# Patient Record
Sex: MALE | Race: WHITE | NOT HISPANIC OR LATINO | Employment: UNEMPLOYED | URBAN - NONMETROPOLITAN AREA
[De-identification: names, ages, dates, MRNs, and addresses within clinical notes are randomized per-mention and may not be internally consistent; named-entity substitution may affect disease eponyms.]

---

## 2017-01-28 ENCOUNTER — HOSPITAL ENCOUNTER (EMERGENCY)
Facility: HOSPITAL | Age: 32
Discharge: ANOTHER HOSPITAL | End: 2017-01-29
Attending: EMERGENCY MEDICINE | Admitting: EMERGENCY MEDICINE

## 2017-01-28 DIAGNOSIS — F19.10 IV DRUG ABUSE (HCC): Primary | ICD-10-CM

## 2017-01-28 DIAGNOSIS — L03.114 CELLULITIS OF ARM, LEFT: ICD-10-CM

## 2017-01-28 PROCEDURE — 99284 EMERGENCY DEPT VISIT MOD MDM: CPT

## 2017-01-29 VITALS
SYSTOLIC BLOOD PRESSURE: 113 MMHG | RESPIRATION RATE: 18 BRPM | WEIGHT: 148 LBS | DIASTOLIC BLOOD PRESSURE: 73 MMHG | HEART RATE: 91 BPM | OXYGEN SATURATION: 100 % | TEMPERATURE: 97.9 F | HEIGHT: 70 IN | BODY MASS INDEX: 21.19 KG/M2

## 2017-01-29 PROCEDURE — 25010000002 CEFTRIAXONE PER 250 MG: Performed by: EMERGENCY MEDICINE

## 2017-01-29 PROCEDURE — 96372 THER/PROPH/DIAG INJ SC/IM: CPT

## 2017-01-29 RX ORDER — TRAZODONE HYDROCHLORIDE 150 MG/1
150 TABLET ORAL NIGHTLY
COMMUNITY
End: 2018-02-21 | Stop reason: SDDI

## 2017-01-29 RX ORDER — CEFTRIAXONE 1 G/1
1000 INJECTION, POWDER, FOR SOLUTION INTRAMUSCULAR; INTRAVENOUS ONCE
Status: COMPLETED | OUTPATIENT
Start: 2017-01-29 | End: 2017-01-29

## 2017-01-29 RX ORDER — BUPROPION HYDROCHLORIDE 150 MG/1
150 TABLET ORAL DAILY
COMMUNITY
End: 2018-02-21 | Stop reason: SDDI

## 2017-01-29 RX ORDER — LIDOCAINE HYDROCHLORIDE 10 MG/ML
2.1 INJECTION, SOLUTION INFILTRATION; PERINEURAL ONCE
Status: DISCONTINUED | OUTPATIENT
Start: 2017-01-29 | End: 2017-01-29 | Stop reason: HOSPADM

## 2017-01-29 RX ORDER — LIDOCAINE HYDROCHLORIDE 10 MG/ML
2.1 INJECTION, SOLUTION EPIDURAL; INFILTRATION; INTRACAUDAL; PERINEURAL ONCE
Status: COMPLETED | OUTPATIENT
Start: 2017-01-29 | End: 2017-01-29

## 2017-01-29 RX ORDER — HYDROCODONE BITARTRATE AND ACETAMINOPHEN 7.5; 325 MG/1; MG/1
1 TABLET ORAL ONCE
Status: COMPLETED | OUTPATIENT
Start: 2017-01-29 | End: 2017-01-29

## 2017-01-29 RX ADMIN — LIDOCAINE HYDROCHLORIDE 2.1 ML: 10 INJECTION, SOLUTION EPIDURAL; INFILTRATION; INTRACAUDAL; PERINEURAL at 02:40

## 2017-01-29 RX ADMIN — CEFTRIAXONE SODIUM 1000 MG: 1 INJECTION, POWDER, FOR SOLUTION INTRAMUSCULAR; INTRAVENOUS at 02:41

## 2017-01-29 RX ADMIN — HYDROCODONE BITARTRATE AND ACETAMINOPHEN 1 TABLET: 7.5; 325 TABLET ORAL at 02:40

## 2017-06-22 ENCOUNTER — HOSPITAL ENCOUNTER (EMERGENCY)
Facility: HOSPITAL | Age: 32
Discharge: HOME | End: 2017-06-22
Attending: EMERGENCY MEDICINE | Admitting: EMERGENCY MEDICINE

## 2017-06-22 VITALS
BODY MASS INDEX: 22.33 KG/M2 | DIASTOLIC BLOOD PRESSURE: 76 MMHG | HEART RATE: 61 BPM | TEMPERATURE: 98.6 F | SYSTOLIC BLOOD PRESSURE: 127 MMHG | WEIGHT: 156 LBS | OXYGEN SATURATION: 100 % | HEIGHT: 70 IN | RESPIRATION RATE: 20 BRPM

## 2017-06-22 DIAGNOSIS — R53.83 FATIGUE, UNSPECIFIED TYPE: Primary | ICD-10-CM

## 2017-06-22 DIAGNOSIS — B34.9 VIRAL SYNDROME: ICD-10-CM

## 2017-06-22 LAB
ALBUMIN SERPL-MCNC: 4.7 G/DL (ref 3.5–5)
ALBUMIN/GLOB SERPL: 1.6 G/DL (ref 1–2)
ALP SERPL-CCNC: 70 U/L (ref 38–126)
ALT SERPL W P-5'-P-CCNC: 73 U/L (ref 13–69)
AMPHET+METHAMPHET UR QL: NEGATIVE
AMPHETAMINES UR QL: NEGATIVE
ANION GAP SERPL CALCULATED.3IONS-SCNC: 18.2 MMOL/L
AST SERPL-CCNC: 42 U/L (ref 15–46)
BARBITURATES UR QL SCN: NEGATIVE
BASOPHILS # BLD AUTO: 0.05 10*3/MM3 (ref 0–0.2)
BASOPHILS NFR BLD AUTO: 0.5 % (ref 0–2.5)
BENZODIAZ UR QL SCN: NEGATIVE
BILIRUB SERPL-MCNC: 0.9 MG/DL (ref 0.2–1.3)
BILIRUB UR QL STRIP: NEGATIVE
BUN BLD-MCNC: 13 MG/DL (ref 7–20)
BUN/CREAT SERPL: 14.4 (ref 6.3–21.9)
BUPRENORPHINE SERPL-MCNC: POSITIVE NG/ML
CALCIUM SPEC-SCNC: 9.9 MG/DL (ref 8.4–10.2)
CANNABINOIDS SERPL QL: POSITIVE
CHLORIDE SERPL-SCNC: 101 MMOL/L (ref 98–107)
CLARITY UR: CLEAR
CO2 SERPL-SCNC: 29 MMOL/L (ref 26–30)
COCAINE UR QL: NEGATIVE
COLOR UR: YELLOW
CREAT BLD-MCNC: 0.9 MG/DL (ref 0.6–1.3)
DEPRECATED RDW RBC AUTO: 37.7 FL (ref 37–54)
EOSINOPHIL # BLD AUTO: 0.14 10*3/MM3 (ref 0–0.7)
EOSINOPHIL NFR BLD AUTO: 1.5 % (ref 0–7)
ERYTHROCYTE [DISTWIDTH] IN BLOOD BY AUTOMATED COUNT: 11.8 % (ref 11.5–14.5)
FLUAV AG NPH QL: NEGATIVE
FLUBV AG NPH QL IA: NEGATIVE
GFR SERPL CREATININE-BSD FRML MDRD: 100 ML/MIN/1.73
GLOBULIN UR ELPH-MCNC: 3 GM/DL
GLUCOSE BLD-MCNC: 80 MG/DL (ref 74–98)
GLUCOSE UR STRIP-MCNC: NEGATIVE MG/DL
HCT VFR BLD AUTO: 43.9 % (ref 42–52)
HGB BLD-MCNC: 15.7 G/DL (ref 14–18)
HGB UR QL STRIP.AUTO: NEGATIVE
IMM GRANULOCYTES # BLD: 0.03 10*3/MM3 (ref 0–0.06)
IMM GRANULOCYTES NFR BLD: 0.3 % (ref 0–0.6)
KETONES UR QL STRIP: NEGATIVE
LEUKOCYTE ESTERASE UR QL STRIP.AUTO: NEGATIVE
LYMPHOCYTES # BLD AUTO: 3.14 10*3/MM3 (ref 0.6–3.4)
LYMPHOCYTES NFR BLD AUTO: 34.2 % (ref 10–50)
MCH RBC QN AUTO: 31.6 PG (ref 27–31)
MCHC RBC AUTO-ENTMCNC: 35.8 G/DL (ref 30–37)
MCV RBC AUTO: 88.3 FL (ref 80–94)
METHADONE UR QL SCN: NEGATIVE
MONOCYTES # BLD AUTO: 0.76 10*3/MM3 (ref 0–0.9)
MONOCYTES NFR BLD AUTO: 8.3 % (ref 0–12)
NEUTROPHILS # BLD AUTO: 5.05 10*3/MM3 (ref 2–6.9)
NEUTROPHILS NFR BLD AUTO: 55.2 % (ref 37–80)
NITRITE UR QL STRIP: NEGATIVE
NRBC BLD MANUAL-RTO: 0 /100 WBC (ref 0–0)
OPIATES UR QL: NEGATIVE
OXYCODONE UR QL SCN: NEGATIVE
PCP UR QL SCN: NEGATIVE
PH UR STRIP.AUTO: 5.5 [PH] (ref 5–8)
PLATELET # BLD AUTO: 212 10*3/MM3 (ref 130–400)
PMV BLD AUTO: 10 FL (ref 6–12)
POTASSIUM BLD-SCNC: 4.2 MMOL/L (ref 3.5–5.1)
PROPOXYPH UR QL: NEGATIVE
PROT SERPL-MCNC: 7.7 G/DL (ref 6.3–8.2)
PROT UR QL STRIP: NEGATIVE
RBC # BLD AUTO: 4.97 10*6/MM3 (ref 4.7–6.1)
S PYO AG THROAT QL: NEGATIVE
SODIUM BLD-SCNC: 144 MMOL/L (ref 137–145)
SP GR UR STRIP: 1.02 (ref 1–1.03)
TRICYCLICS UR QL SCN: NEGATIVE
UROBILINOGEN UR QL STRIP: NORMAL
WBC NRBC COR # BLD: 9.17 10*3/MM3 (ref 4.8–10.8)

## 2017-06-22 PROCEDURE — 87880 STREP A ASSAY W/OPTIC: CPT | Performed by: PHYSICIAN ASSISTANT

## 2017-06-22 PROCEDURE — 96360 HYDRATION IV INFUSION INIT: CPT

## 2017-06-22 PROCEDURE — 80306 DRUG TEST PRSMV INSTRMNT: CPT | Performed by: PHYSICIAN ASSISTANT

## 2017-06-22 PROCEDURE — 80053 COMPREHEN METABOLIC PANEL: CPT | Performed by: PHYSICIAN ASSISTANT

## 2017-06-22 PROCEDURE — 85025 COMPLETE CBC W/AUTO DIFF WBC: CPT | Performed by: PHYSICIAN ASSISTANT

## 2017-06-22 PROCEDURE — 87804 INFLUENZA ASSAY W/OPTIC: CPT | Performed by: PHYSICIAN ASSISTANT

## 2017-06-22 PROCEDURE — 81003 URINALYSIS AUTO W/O SCOPE: CPT | Performed by: PHYSICIAN ASSISTANT

## 2017-06-22 PROCEDURE — 87081 CULTURE SCREEN ONLY: CPT | Performed by: PHYSICIAN ASSISTANT

## 2017-06-22 PROCEDURE — 99284 EMERGENCY DEPT VISIT MOD MDM: CPT

## 2017-06-22 RX ORDER — ONDANSETRON 4 MG/1
4 TABLET, FILM COATED ORAL EVERY 6 HOURS
Qty: 10 TABLET | Refills: 0 | Status: SHIPPED | OUTPATIENT
Start: 2017-06-22 | End: 2018-02-21 | Stop reason: SDDI

## 2017-06-22 RX ORDER — SODIUM CHLORIDE 0.9 % (FLUSH) 0.9 %
10 SYRINGE (ML) INJECTION AS NEEDED
Status: DISCONTINUED | OUTPATIENT
Start: 2017-06-22 | End: 2017-06-23 | Stop reason: HOSPADM

## 2017-06-22 RX ADMIN — SODIUM CHLORIDE 1000 ML: 9 INJECTION, SOLUTION INTRAVENOUS at 21:45

## 2017-06-23 NOTE — ED PROVIDER NOTES
Subjective   Patient is a 31 y.o. male presenting with fatigue.   History provided by:  Patient   used: No    Fatigue   Location:  Generalized  Severity:  Moderate  Onset quality:  Sudden  Duration:  2 days  Timing:  Constant  Progression:  Worsening  Chronicity:  New  Associated symptoms: fatigue, nausea and vomiting    Associated symptoms: no congestion, no cough, no fever, no headaches and no loss of consciousness        Review of Systems   Constitutional: Positive for chills and fatigue. Negative for fever.   HENT: Negative for congestion.    Respiratory: Negative for cough.    Gastrointestinal: Positive for nausea and vomiting.   Neurological: Negative for loss of consciousness and headaches.   All other systems reviewed and are negative.      Past Medical History:   Diagnosis Date   • Anxiety    • Depression        No Known Allergies    History reviewed. No pertinent surgical history.    History reviewed. No pertinent family history.    Social History     Social History   • Marital status: Single     Spouse name: N/A   • Number of children: N/A   • Years of education: N/A     Social History Main Topics   • Smoking status: Current Every Day Smoker     Packs/day: 0.50     Types: Cigarettes   • Smokeless tobacco: None   • Alcohol use None   • Drug use: No   • Sexual activity: Not Asked     Other Topics Concern   • None     Social History Narrative   • None           Objective   Physical Exam   Constitutional: He is oriented to person, place, and time. He appears well-developed and well-nourished.   HENT:   Head: Normocephalic and atraumatic.   Right Ear: External ear normal.   Left Ear: External ear normal.   Nose: Nose normal.   Mouth/Throat: Oropharynx is clear and moist.   Eyes: Conjunctivae and EOM are normal. Pupils are equal, round, and reactive to light.   Neck: Normal range of motion. Neck supple.   Cardiovascular: Normal rate, regular rhythm, normal heart sounds and intact distal  pulses.    Pulmonary/Chest: Effort normal and breath sounds normal.   Abdominal: Soft. Normal appearance and bowel sounds are normal. There is generalized tenderness.   Musculoskeletal: Normal range of motion.   Neurological: He is alert and oriented to person, place, and time.   Skin: Skin is warm and dry.   Psychiatric: He has a normal mood and affect. His behavior is normal. Judgment and thought content normal.   Nursing note and vitals reviewed.      Procedures         ED Course  ED Course   Comment By Time   31-year-old male comes in complaining of fatigue, weakness, nausea, vomiting for the past 4-5 days.  Patient denies any cough, congestion, headache, dizziness, diarrhea, constipation.  Patient does state he said mild diffuse abdominal cramping. Rizwan Michel PA-C 06/22 2108                  MDM  Number of Diagnoses or Management Options  Fatigue, unspecified type: new and requires workup  Viral syndrome: new and requires workup     Amount and/or Complexity of Data Reviewed  Clinical lab tests: ordered and reviewed    Risk of Complications, Morbidity, and/or Mortality  Presenting problems: moderate  Diagnostic procedures: moderate  Management options: moderate    Patient Progress  Patient progress: stable      Final diagnoses:   Fatigue, unspecified type   Viral syndrome            Rizwan Michel PA-C  06/22/17 3448

## 2017-06-24 LAB — BACTERIA SPEC AEROBE CULT: NORMAL

## 2020-05-11 ENCOUNTER — APPOINTMENT (OUTPATIENT)
Dept: GENERAL RADIOLOGY | Facility: HOSPITAL | Age: 35
End: 2020-05-11

## 2020-05-11 ENCOUNTER — HOSPITAL ENCOUNTER (INPATIENT)
Facility: HOSPITAL | Age: 35
LOS: 2 days | Discharge: HOME | End: 2020-05-14
Attending: EMERGENCY MEDICINE | Admitting: INTERNAL MEDICINE

## 2020-05-11 DIAGNOSIS — J96.01 ACUTE RESPIRATORY FAILURE WITH HYPOXIA (HCC): ICD-10-CM

## 2020-05-11 DIAGNOSIS — T40.1X1A ACCIDENTAL OVERDOSE OF HEROIN, INITIAL ENCOUNTER (HCC): Primary | ICD-10-CM

## 2020-05-11 PROCEDURE — 71045 X-RAY EXAM CHEST 1 VIEW: CPT

## 2020-05-11 PROCEDURE — 99285 EMERGENCY DEPT VISIT HI MDM: CPT

## 2020-05-11 RX ORDER — NALOXONE HYDROCHLORIDE 1 MG/ML
INJECTION INTRAMUSCULAR; INTRAVENOUS; SUBCUTANEOUS
Status: COMPLETED
Start: 2020-05-11 | End: 2020-05-11

## 2020-05-11 RX ADMIN — NALOXONE HYDROCHLORIDE 2 MG: 1 INJECTION PARENTERAL at 23:38

## 2020-05-12 ENCOUNTER — APPOINTMENT (OUTPATIENT)
Dept: CT IMAGING | Facility: HOSPITAL | Age: 35
End: 2020-05-12

## 2020-05-12 PROBLEM — R00.0 TACHYCARDIA: Status: ACTIVE | Noted: 2020-05-12

## 2020-05-12 PROBLEM — T40.1X1A ACCIDENTAL OVERDOSE OF HEROIN (HCC): Status: ACTIVE | Noted: 2020-05-12

## 2020-05-12 PROBLEM — R73.9 HYPERGLYCEMIA: Status: ACTIVE | Noted: 2020-05-12

## 2020-05-12 PROBLEM — J96.01 ACUTE RESPIRATORY FAILURE WITH HYPOXIA (HCC): Status: ACTIVE | Noted: 2020-05-12

## 2020-05-12 PROBLEM — D72.829 LEUKOCYTOSIS: Status: ACTIVE | Noted: 2020-05-12

## 2020-05-12 PROBLEM — R11.10 NON-INTRACTABLE VOMITING: Status: ACTIVE | Noted: 2020-05-12

## 2020-05-12 PROBLEM — A41.9 SEPSIS, UNSPECIFIED ORGANISM (HCC): Status: ACTIVE | Noted: 2020-05-12

## 2020-05-12 PROBLEM — Z20.822 SUSPECTED COVID-19 VIRUS INFECTION: Status: ACTIVE | Noted: 2020-05-12

## 2020-05-12 PROBLEM — J69.0 ASPIRATION PNEUMONIA DUE TO VOMITUS (HCC): Status: ACTIVE | Noted: 2020-05-12

## 2020-05-12 PROBLEM — R79.89 ELEVATED D-DIMER: Status: ACTIVE | Noted: 2020-05-12

## 2020-05-12 LAB
ALBUMIN SERPL-MCNC: 4.8 G/DL (ref 3.5–5.2)
ALBUMIN SERPL-MCNC: 5.4 G/DL (ref 3.5–5.2)
ALBUMIN/GLOB SERPL: 1.9 G/DL
ALBUMIN/GLOB SERPL: 2.1 G/DL
ALP SERPL-CCNC: 127 U/L (ref 39–117)
ALP SERPL-CCNC: 142 U/L (ref 39–117)
ALT SERPL W P-5'-P-CCNC: 18 U/L (ref 1–41)
ALT SERPL W P-5'-P-CCNC: 23 U/L (ref 1–41)
AMPHET+METHAMPHET UR QL: POSITIVE
AMPHETAMINES UR QL: NEGATIVE
ANION GAP SERPL CALCULATED.3IONS-SCNC: 13.8 MMOL/L (ref 5–15)
ANION GAP SERPL CALCULATED.3IONS-SCNC: 14.4 MMOL/L (ref 5–15)
AST SERPL-CCNC: 28 U/L (ref 1–40)
AST SERPL-CCNC: 32 U/L (ref 1–40)
B PERT DNA SPEC QL NAA+PROBE: NOT DETECTED
BARBITURATES UR QL SCN: NEGATIVE
BASOPHILS # BLD AUTO: 0.08 10*3/MM3 (ref 0–0.2)
BASOPHILS # BLD AUTO: 0.13 10*3/MM3 (ref 0–0.2)
BASOPHILS NFR BLD AUTO: 0.3 % (ref 0–1.5)
BASOPHILS NFR BLD AUTO: 0.5 % (ref 0–1.5)
BENZODIAZ UR QL SCN: NEGATIVE
BILIRUB SERPL-MCNC: 0.4 MG/DL (ref 0.2–1.2)
BILIRUB SERPL-MCNC: 0.6 MG/DL (ref 0.2–1.2)
BILIRUB UR QL STRIP: NEGATIVE
BUN BLD-MCNC: 19 MG/DL (ref 6–20)
BUN BLD-MCNC: 20 MG/DL (ref 6–20)
BUN/CREAT SERPL: 17.1 (ref 7–25)
BUN/CREAT SERPL: 17.1 (ref 7–25)
BUPRENORPHINE SERPL-MCNC: NEGATIVE NG/ML
C PNEUM DNA NPH QL NAA+NON-PROBE: NOT DETECTED
CALCIUM SPEC-SCNC: 9.8 MG/DL (ref 8.6–10.5)
CALCIUM SPEC-SCNC: 9.9 MG/DL (ref 8.6–10.5)
CANNABINOIDS SERPL QL: POSITIVE
CHLORIDE SERPL-SCNC: 102 MMOL/L (ref 98–107)
CHLORIDE SERPL-SCNC: 102 MMOL/L (ref 98–107)
CLARITY UR: CLEAR
CO2 SERPL-SCNC: 24.2 MMOL/L (ref 22–29)
CO2 SERPL-SCNC: 24.6 MMOL/L (ref 22–29)
COCAINE UR QL: NEGATIVE
COLOR UR: YELLOW
CREAT BLD-MCNC: 1.11 MG/DL (ref 0.76–1.27)
CREAT BLD-MCNC: 1.17 MG/DL (ref 0.76–1.27)
CRP SERPL-MCNC: 0.08 MG/DL (ref 0–0.5)
D DIMER PPP FEU-MCNC: 1.2 MCGFEU/ML (ref 0–0.57)
D-LACTATE SERPL-SCNC: 1.2 MMOL/L (ref 0.5–2)
DEPRECATED RDW RBC AUTO: 39.8 FL (ref 37–54)
DEPRECATED RDW RBC AUTO: 40.9 FL (ref 37–54)
EOSINOPHIL # BLD AUTO: 0 10*3/MM3 (ref 0–0.4)
EOSINOPHIL # BLD AUTO: 0.01 10*3/MM3 (ref 0–0.4)
EOSINOPHIL NFR BLD AUTO: 0 % (ref 0.3–6.2)
EOSINOPHIL NFR BLD AUTO: 0 % (ref 0.3–6.2)
ERYTHROCYTE [DISTWIDTH] IN BLOOD BY AUTOMATED COUNT: 12 % (ref 12.3–15.4)
ERYTHROCYTE [DISTWIDTH] IN BLOOD BY AUTOMATED COUNT: 12.1 % (ref 12.3–15.4)
ETHANOL BLD-MCNC: <10 MG/DL (ref 0–10)
ETHANOL UR QL: <0.01 %
FERRITIN SERPL-MCNC: 86.23 NG/ML (ref 30–400)
FLUAV H1 2009 PAND RNA NPH QL NAA+PROBE: NOT DETECTED
FLUAV H1 HA GENE NPH QL NAA+PROBE: NOT DETECTED
FLUAV H3 RNA NPH QL NAA+PROBE: NOT DETECTED
FLUAV SUBTYP SPEC NAA+PROBE: NOT DETECTED
FLUBV RNA ISLT QL NAA+PROBE: NOT DETECTED
GFR SERPL CREATININE-BSD FRML MDRD: 72 ML/MIN/1.73
GFR SERPL CREATININE-BSD FRML MDRD: 77 ML/MIN/1.73
GLOBULIN UR ELPH-MCNC: 2.5 GM/DL
GLOBULIN UR ELPH-MCNC: 2.6 GM/DL
GLUCOSE BLD-MCNC: 110 MG/DL (ref 65–99)
GLUCOSE BLD-MCNC: 147 MG/DL (ref 65–99)
GLUCOSE BLDC GLUCOMTR-MCNC: 110 MG/DL (ref 70–130)
GLUCOSE BLDC GLUCOMTR-MCNC: 126 MG/DL (ref 70–130)
GLUCOSE BLDC GLUCOMTR-MCNC: 145 MG/DL (ref 70–130)
GLUCOSE UR STRIP-MCNC: NEGATIVE MG/DL
HADV DNA SPEC NAA+PROBE: NOT DETECTED
HBA1C MFR BLD: 4.9 % (ref 4.8–5.6)
HCOV 229E RNA SPEC QL NAA+PROBE: NOT DETECTED
HCOV HKU1 RNA SPEC QL NAA+PROBE: NOT DETECTED
HCOV NL63 RNA SPEC QL NAA+PROBE: NOT DETECTED
HCOV OC43 RNA SPEC QL NAA+PROBE: NOT DETECTED
HCT VFR BLD AUTO: 48.9 % (ref 37.5–51)
HCT VFR BLD AUTO: 53.2 % (ref 37.5–51)
HGB BLD-MCNC: 17.2 G/DL (ref 13–17.7)
HGB BLD-MCNC: 18.6 G/DL (ref 13–17.7)
HGB UR QL STRIP.AUTO: NEGATIVE
HMPV RNA NPH QL NAA+NON-PROBE: NOT DETECTED
HPIV1 RNA SPEC QL NAA+PROBE: NOT DETECTED
HPIV2 RNA SPEC QL NAA+PROBE: NOT DETECTED
HPIV3 RNA NPH QL NAA+PROBE: NOT DETECTED
HPIV4 P GENE NPH QL NAA+PROBE: NOT DETECTED
IMM GRANULOCYTES # BLD AUTO: 0.18 10*3/MM3 (ref 0–0.05)
IMM GRANULOCYTES # BLD AUTO: 0.19 10*3/MM3 (ref 0–0.05)
IMM GRANULOCYTES NFR BLD AUTO: 0.7 % (ref 0–0.5)
IMM GRANULOCYTES NFR BLD AUTO: 0.7 % (ref 0–0.5)
KETONES UR QL STRIP: NEGATIVE
L PNEUMO1 AG UR QL IA: NEGATIVE
LDH SERPL-CCNC: 238 U/L (ref 135–225)
LEUKOCYTE ESTERASE UR QL STRIP.AUTO: NEGATIVE
LYMPHOCYTES # BLD AUTO: 1.38 10*3/MM3 (ref 0.7–3.1)
LYMPHOCYTES # BLD AUTO: 1.57 10*3/MM3 (ref 0.7–3.1)
LYMPHOCYTES NFR BLD AUTO: 5 % (ref 19.6–45.3)
LYMPHOCYTES NFR BLD AUTO: 6 % (ref 19.6–45.3)
M PNEUMO IGG SER IA-ACNC: NOT DETECTED
MAGNESIUM SERPL-MCNC: 2.1 MG/DL (ref 1.6–2.6)
MCH RBC QN AUTO: 31.9 PG (ref 26.6–33)
MCH RBC QN AUTO: 32.1 PG (ref 26.6–33)
MCHC RBC AUTO-ENTMCNC: 35 G/DL (ref 31.5–35.7)
MCHC RBC AUTO-ENTMCNC: 35.2 G/DL (ref 31.5–35.7)
MCV RBC AUTO: 90.7 FL (ref 79–97)
MCV RBC AUTO: 91.7 FL (ref 79–97)
METHADONE UR QL SCN: NEGATIVE
MONOCYTES # BLD AUTO: 1.47 10*3/MM3 (ref 0.1–0.9)
MONOCYTES # BLD AUTO: 1.57 10*3/MM3 (ref 0.1–0.9)
MONOCYTES NFR BLD AUTO: 5.6 % (ref 5–12)
MONOCYTES NFR BLD AUTO: 5.7 % (ref 5–12)
MRSA DNA SPEC QL NAA+PROBE: ABNORMAL
NEUTROPHILS # BLD AUTO: 23.07 10*3/MM3 (ref 1.7–7)
NEUTROPHILS # BLD AUTO: 24.5 10*3/MM3 (ref 1.7–7)
NEUTROPHILS NFR BLD AUTO: 87.4 % (ref 42.7–76)
NEUTROPHILS NFR BLD AUTO: 88.1 % (ref 42.7–76)
NITRITE UR QL STRIP: NEGATIVE
NRBC BLD AUTO-RTO: 0 /100 WBC (ref 0–0.2)
NRBC BLD AUTO-RTO: 0 /100 WBC (ref 0–0.2)
NT-PROBNP SERPL-MCNC: 10.2 PG/ML (ref 5–450)
OPIATES UR QL: NEGATIVE
OXYCODONE UR QL SCN: NEGATIVE
PCP UR QL SCN: NEGATIVE
PH UR STRIP.AUTO: <=5 [PH] (ref 5–8)
PLATELET # BLD AUTO: 252 10*3/MM3 (ref 140–450)
PLATELET # BLD AUTO: 259 10*3/MM3 (ref 140–450)
PMV BLD AUTO: 10 FL (ref 6–12)
PMV BLD AUTO: 10.4 FL (ref 6–12)
POTASSIUM BLD-SCNC: 4.3 MMOL/L (ref 3.5–5.2)
POTASSIUM BLD-SCNC: 5.2 MMOL/L (ref 3.5–5.2)
PROCALCITONIN SERPL-MCNC: 0.07 NG/ML (ref 0.1–0.25)
PROPOXYPH UR QL: NEGATIVE
PROT SERPL-MCNC: 7.3 G/DL (ref 6–8.5)
PROT SERPL-MCNC: 8 G/DL (ref 6–8.5)
PROT UR QL STRIP: NEGATIVE
RBC # BLD AUTO: 5.39 10*6/MM3 (ref 4.14–5.8)
RBC # BLD AUTO: 5.8 10*6/MM3 (ref 4.14–5.8)
RHINOVIRUS RNA SPEC NAA+PROBE: NOT DETECTED
RSV RNA NPH QL NAA+NON-PROBE: NOT DETECTED
S PNEUM AG SPEC QL LA: NEGATIVE
SARS-COV-2 RNA RESP QL NAA+PROBE: NOT DETECTED
SODIUM BLD-SCNC: 140 MMOL/L (ref 136–145)
SODIUM BLD-SCNC: 141 MMOL/L (ref 136–145)
SP GR UR STRIP: >=1.03 (ref 1–1.03)
TRICYCLICS UR QL SCN: NEGATIVE
TROPONIN T SERPL-MCNC: <0.01 NG/ML (ref 0–0.03)
UROBILINOGEN UR QL STRIP: NORMAL
WBC NRBC COR # BLD: 26.38 10*3/MM3 (ref 3.4–10.8)
WBC NRBC COR # BLD: 27.77 10*3/MM3 (ref 3.4–10.8)

## 2020-05-12 PROCEDURE — 25010000002 VANCOMYCIN 5 G RECONSTITUTED SOLUTION 5,000 MG VIAL: Performed by: EMERGENCY MEDICINE

## 2020-05-12 PROCEDURE — 83735 ASSAY OF MAGNESIUM: CPT | Performed by: FAMILY MEDICINE

## 2020-05-12 PROCEDURE — 80053 COMPREHEN METABOLIC PANEL: CPT | Performed by: EMERGENCY MEDICINE

## 2020-05-12 PROCEDURE — 93005 ELECTROCARDIOGRAM TRACING: CPT | Performed by: EMERGENCY MEDICINE

## 2020-05-12 PROCEDURE — 85379 FIBRIN DEGRADATION QUANT: CPT | Performed by: FAMILY MEDICINE

## 2020-05-12 PROCEDURE — 25010000002 VANCOMYCIN 5 G RECONSTITUTED SOLUTION 5,000 MG VIAL: Performed by: FAMILY MEDICINE

## 2020-05-12 PROCEDURE — 25010000002 PIPERACILLIN SOD-TAZOBACTAM PER 1 G: Performed by: EMERGENCY MEDICINE

## 2020-05-12 PROCEDURE — 85025 COMPLETE CBC W/AUTO DIFF WBC: CPT | Performed by: EMERGENCY MEDICINE

## 2020-05-12 PROCEDURE — 85025 COMPLETE CBC W/AUTO DIFF WBC: CPT | Performed by: FAMILY MEDICINE

## 2020-05-12 PROCEDURE — 84484 ASSAY OF TROPONIN QUANT: CPT | Performed by: EMERGENCY MEDICINE

## 2020-05-12 PROCEDURE — 82962 GLUCOSE BLOOD TEST: CPT

## 2020-05-12 PROCEDURE — 71275 CT ANGIOGRAPHY CHEST: CPT

## 2020-05-12 PROCEDURE — 87635 SARS-COV-2 COVID-19 AMP PRB: CPT | Performed by: EMERGENCY MEDICINE

## 2020-05-12 PROCEDURE — 87040 BLOOD CULTURE FOR BACTERIA: CPT | Performed by: EMERGENCY MEDICINE

## 2020-05-12 PROCEDURE — 84145 PROCALCITONIN (PCT): CPT | Performed by: FAMILY MEDICINE

## 2020-05-12 PROCEDURE — 0099U HC BIOFIRE FILMARRAY RESP PANEL 1: CPT | Performed by: FAMILY MEDICINE

## 2020-05-12 PROCEDURE — 81003 URINALYSIS AUTO W/O SCOPE: CPT | Performed by: FAMILY MEDICINE

## 2020-05-12 PROCEDURE — 83605 ASSAY OF LACTIC ACID: CPT | Performed by: EMERGENCY MEDICINE

## 2020-05-12 PROCEDURE — 87899 AGENT NOS ASSAY W/OPTIC: CPT | Performed by: FAMILY MEDICINE

## 2020-05-12 PROCEDURE — 83880 ASSAY OF NATRIURETIC PEPTIDE: CPT | Performed by: EMERGENCY MEDICINE

## 2020-05-12 PROCEDURE — 25010000002 ONDANSETRON PER 1 MG: Performed by: EMERGENCY MEDICINE

## 2020-05-12 PROCEDURE — 80053 COMPREHEN METABOLIC PANEL: CPT | Performed by: FAMILY MEDICINE

## 2020-05-12 PROCEDURE — 87641 MR-STAPH DNA AMP PROBE: CPT | Performed by: FAMILY MEDICINE

## 2020-05-12 PROCEDURE — 83036 HEMOGLOBIN GLYCOSYLATED A1C: CPT | Performed by: FAMILY MEDICINE

## 2020-05-12 PROCEDURE — 83615 LACTATE (LD) (LDH) ENZYME: CPT | Performed by: FAMILY MEDICINE

## 2020-05-12 PROCEDURE — 80307 DRUG TEST PRSMV CHEM ANLYZR: CPT | Performed by: EMERGENCY MEDICINE

## 2020-05-12 PROCEDURE — 25010000002 IOPAMIDOL 61 % SOLUTION: Performed by: FAMILY MEDICINE

## 2020-05-12 PROCEDURE — 82728 ASSAY OF FERRITIN: CPT | Performed by: FAMILY MEDICINE

## 2020-05-12 PROCEDURE — 94799 UNLISTED PULMONARY SVC/PX: CPT

## 2020-05-12 PROCEDURE — 86140 C-REACTIVE PROTEIN: CPT | Performed by: FAMILY MEDICINE

## 2020-05-12 PROCEDURE — 25010000002 PIPERACILLIN SOD-TAZOBACTAM PER 1 G: Performed by: FAMILY MEDICINE

## 2020-05-12 PROCEDURE — 99223 1ST HOSP IP/OBS HIGH 75: CPT | Performed by: FAMILY MEDICINE

## 2020-05-12 RX ORDER — SODIUM CHLORIDE 0.9 % (FLUSH) 0.9 %
10 SYRINGE (ML) INJECTION EVERY 12 HOURS SCHEDULED
Status: DISCONTINUED | OUTPATIENT
Start: 2020-05-12 | End: 2020-05-14 | Stop reason: HOSPADM

## 2020-05-12 RX ORDER — HYDROXYZINE PAMOATE 25 MG/1
25-50 CAPSULE ORAL EVERY 6 HOURS PRN
COMMUNITY
End: 2020-06-19

## 2020-05-12 RX ORDER — VENLAFAXINE HYDROCHLORIDE 150 MG/1
150 CAPSULE, EXTENDED RELEASE ORAL DAILY
COMMUNITY

## 2020-05-12 RX ORDER — MULTIPLE VITAMINS W/ MINERALS TAB 9MG-400MCG
1 TAB ORAL DAILY
COMMUNITY
End: 2020-05-14 | Stop reason: HOSPADM

## 2020-05-12 RX ORDER — BUPROPION HYDROCHLORIDE 100 MG/1
100 TABLET ORAL DAILY
Status: DISCONTINUED | OUTPATIENT
Start: 2020-05-12 | End: 2020-05-14 | Stop reason: HOSPADM

## 2020-05-12 RX ORDER — QUETIAPINE FUMARATE 50 MG/1
50-100 TABLET, FILM COATED ORAL NIGHTLY
COMMUNITY
End: 2020-05-14 | Stop reason: HOSPADM

## 2020-05-12 RX ORDER — BUPROPION HYDROCHLORIDE 100 MG/1
300 TABLET ORAL DAILY
Status: ON HOLD | COMMUNITY
End: 2020-05-12

## 2020-05-12 RX ORDER — DEXTROSE MONOHYDRATE 25 G/50ML
25 INJECTION, SOLUTION INTRAVENOUS
Status: DISCONTINUED | OUTPATIENT
Start: 2020-05-12 | End: 2020-05-14 | Stop reason: HOSPADM

## 2020-05-12 RX ORDER — CLONIDINE HYDROCHLORIDE 0.1 MG/1
0.1 TABLET ORAL NIGHTLY
COMMUNITY

## 2020-05-12 RX ORDER — DEXTROAMPHETAMINE SACCHARATE, AMPHETAMINE ASPARTATE, DEXTROAMPHETAMINE SULFATE AND AMPHETAMINE SULFATE 5; 5; 5; 5 MG/1; MG/1; MG/1; MG/1
20 TABLET ORAL 2 TIMES DAILY
Status: DISCONTINUED | OUTPATIENT
Start: 2020-05-12 | End: 2020-05-14 | Stop reason: HOSPADM

## 2020-05-12 RX ORDER — SODIUM CHLORIDE 9 MG/ML
125 INJECTION, SOLUTION INTRAVENOUS CONTINUOUS
Status: DISCONTINUED | OUTPATIENT
Start: 2020-05-12 | End: 2020-05-14 | Stop reason: HOSPADM

## 2020-05-12 RX ORDER — VENLAFAXINE HYDROCHLORIDE 150 MG/1
150 CAPSULE, EXTENDED RELEASE ORAL
Status: DISCONTINUED | OUTPATIENT
Start: 2020-05-12 | End: 2020-05-14 | Stop reason: HOSPADM

## 2020-05-12 RX ORDER — QUETIAPINE FUMARATE 50 MG/1
25 TABLET, FILM COATED ORAL EVERY 8 HOURS PRN
COMMUNITY
End: 2020-05-14 | Stop reason: HOSPADM

## 2020-05-12 RX ORDER — ALBUTEROL SULFATE 90 UG/1
2 AEROSOL, METERED RESPIRATORY (INHALATION)
Status: DISCONTINUED | OUTPATIENT
Start: 2020-05-12 | End: 2020-05-14 | Stop reason: HOSPADM

## 2020-05-12 RX ORDER — VENLAFAXINE HYDROCHLORIDE 75 MG/1
75 CAPSULE, EXTENDED RELEASE ORAL DAILY
COMMUNITY
End: 2020-05-14 | Stop reason: HOSPADM

## 2020-05-12 RX ORDER — NICOTINE POLACRILEX 4 MG
1 LOZENGE BUCCAL
Status: DISCONTINUED | OUTPATIENT
Start: 2020-05-12 | End: 2020-05-14 | Stop reason: HOSPADM

## 2020-05-12 RX ORDER — NICOTINE 21 MG/24HR
1 PATCH, TRANSDERMAL 24 HOURS TRANSDERMAL EVERY 24 HOURS
Status: DISCONTINUED | OUTPATIENT
Start: 2020-05-12 | End: 2020-05-14 | Stop reason: HOSPADM

## 2020-05-12 RX ORDER — BUPROPION HYDROCHLORIDE 150 MG/1
300 TABLET ORAL DAILY
COMMUNITY

## 2020-05-12 RX ORDER — ONDANSETRON 2 MG/ML
4 INJECTION INTRAMUSCULAR; INTRAVENOUS ONCE
Status: COMPLETED | OUTPATIENT
Start: 2020-05-12 | End: 2020-05-12

## 2020-05-12 RX ORDER — ONDANSETRON 2 MG/ML
4 INJECTION INTRAMUSCULAR; INTRAVENOUS EVERY 6 HOURS PRN
Status: DISCONTINUED | OUTPATIENT
Start: 2020-05-12 | End: 2020-05-14 | Stop reason: HOSPADM

## 2020-05-12 RX ORDER — CLONAZEPAM 0.5 MG/1
0.5 TABLET ORAL DAILY PRN
COMMUNITY
End: 2020-05-14 | Stop reason: HOSPADM

## 2020-05-12 RX ORDER — SODIUM CHLORIDE 0.9 % (FLUSH) 0.9 %
10 SYRINGE (ML) INJECTION AS NEEDED
Status: DISCONTINUED | OUTPATIENT
Start: 2020-05-12 | End: 2020-05-14 | Stop reason: HOSPADM

## 2020-05-12 RX ADMIN — NICOTINE 1 PATCH: 21 PATCH TRANSDERMAL at 05:03

## 2020-05-12 RX ADMIN — IOPAMIDOL 100 ML: 612 INJECTION, SOLUTION INTRAVENOUS at 05:15

## 2020-05-12 RX ADMIN — SODIUM CHLORIDE 100 ML/HR: 9 INJECTION, SOLUTION INTRAVENOUS at 05:41

## 2020-05-12 RX ADMIN — SODIUM CHLORIDE, PRESERVATIVE FREE 10 ML: 5 INJECTION INTRAVENOUS at 09:24

## 2020-05-12 RX ADMIN — VANCOMYCIN HYDROCHLORIDE 1500 MG: 500 INJECTION, POWDER, LYOPHILIZED, FOR SOLUTION INTRAVENOUS at 17:13

## 2020-05-12 RX ADMIN — ONDANSETRON 4 MG: 2 INJECTION INTRAMUSCULAR; INTRAVENOUS at 00:53

## 2020-05-12 RX ADMIN — VANCOMYCIN HYDROCHLORIDE 1750 MG: 500 INJECTION, POWDER, LYOPHILIZED, FOR SOLUTION INTRAVENOUS at 03:46

## 2020-05-12 RX ADMIN — SODIUM CHLORIDE 125 ML/HR: 9 INJECTION, SOLUTION INTRAVENOUS at 22:30

## 2020-05-12 RX ADMIN — DEXTROAMPHETAMINE SACCHARATE, AMPHETAMINE ASPARTATE MONOHYDRATE, DEXTROAMPHETAMINE SULFATE AND AMPHETAMINE SULFATE 20 MG: 5; 5; 5; 5 TABLET ORAL at 13:21

## 2020-05-12 RX ADMIN — ALBUTEROL SULFATE 2 PUFF: 90 AEROSOL, METERED RESPIRATORY (INHALATION) at 09:39

## 2020-05-12 RX ADMIN — ALBUTEROL SULFATE 2 PUFF: 90 AEROSOL, METERED RESPIRATORY (INHALATION) at 17:14

## 2020-05-12 RX ADMIN — TAZOBACTAM SODIUM AND PIPERACILLIN SODIUM 4.5 G: 500; 4 INJECTION, SOLUTION INTRAVENOUS at 09:22

## 2020-05-12 RX ADMIN — SODIUM CHLORIDE 125 ML/HR: 9 INJECTION, SOLUTION INTRAVENOUS at 14:29

## 2020-05-12 RX ADMIN — VENLAFAXINE HYDROCHLORIDE 150 MG: 150 CAPSULE, EXTENDED RELEASE ORAL at 09:22

## 2020-05-12 RX ADMIN — TAZOBACTAM SODIUM AND PIPERACILLIN SODIUM 4.5 G: 500; 4 INJECTION, SOLUTION INTRAVENOUS at 19:11

## 2020-05-12 RX ADMIN — DEXTROAMPHETAMINE SACCHARATE, AMPHETAMINE ASPARTATE MONOHYDRATE, DEXTROAMPHETAMINE SULFATE AND AMPHETAMINE SULFATE 20 MG: 5; 5; 5; 5 TABLET ORAL at 17:14

## 2020-05-12 RX ADMIN — BUPROPION HYDROCHLORIDE 100 MG: 100 TABLET, FILM COATED ORAL at 09:22

## 2020-05-12 RX ADMIN — TAZOBACTAM SODIUM AND PIPERACILLIN SODIUM 3.38 G: 375; 3 INJECTION, SOLUTION INTRAVENOUS at 03:18

## 2020-05-12 NOTE — PHARMACY RECOMMENDATION
"Pharmacokinetic Initial Note - Vancomycin    Elvis Joiner is a 34 y.o. male  175.3 cm (69\") 92.6 kg (204 lb 3.2 oz)    Indication for use: Pneumonia    Results from last 7 days   Lab Units 05/12/20  0258 05/12/20  0117   WBC 10*3/mm3 27.77* 26.38*   CREATININE mg/dL 1.17 1.11      Estimated Creatinine Clearance: 101.9 mL/min (by C-G formula based on SCr of 1.17 mg/dL).  Temp Readings from Last 1 Encounters:   05/12/20 94.7 °F (34.8 °C) (Oral)       Culture results  Microbiology Results (last 10 days)       Procedure Component Value - Date/Time    Respiratory Panel, PCR - Swab, Nasopharynx [[id number]]  (Normal) Collected:  05/12/20 0317    Lab Status:  Final result Specimen:  Swab from Nasopharynx Updated:  05/12/20 0452     ADENOVIRUS, PCR Not Detected     Coronavirus 229E Not Detected     Coronavirus HKU1 Not Detected     Coronavirus NL63 Not Detected     Coronavirus OC43 Not Detected     Human Metapneumovirus Not Detected     Human Rhinovirus/Enterovirus Not Detected     Influenza B PCR Not Detected     Parainfluenza Virus 1 Not Detected     Parainfluenza Virus 2 Not Detected     Parainfluenza Virus 3 Not Detected     Parainfluenza Virus 4 Not Detected     Bordetella pertussis pcr Not Detected     Influenza A H1 2009 PCR Not Detected     Chlamydophila pneumoniae PCR Not Detected     Mycoplasma pneumo by PCR Not Detected     Influenza A PCR Not Detected     Influenza A H3 Not Detected     Influenza A H1 Not Detected     RSV, PCR Not Detected    Narrative:       The coronavirus on the RVP is NOT COVID-19 and is NOT indicative of infection with COVID-19.             Other Antimicrobials  Piperacillin/Tazobactam (Zosyn) 4.5 gm IV q 8 hrs (extended infusion time and dosing interval).    Assessment/Plan  Initiated Vancomycin 1500 mg IV q 12 hrs.  Vancomycin trough due 5/13 1530.  Pharmacy will monitor renal function and adjust dose accordingly.    MRSA PCR has been ordered.    Thank you for the opportunity to " consult on this patient.    Varun Mustafa, Pharm.D.  05/12/20  06:57

## 2020-05-12 NOTE — ED PROVIDER NOTES
"Subjective   34 year old male presenting with overdose. He states that he did a xanax bar earlier and was with a tee \"doing heroin and acting crazy\" but he does not remember doing heroin himself. EMS was called for patient being unresponsive, he was found to be apneic. He was given 4 mg of Narcan and woke up.  On arrival here he is still somewhat sleepy and was given additional 2 mg of Narcan.  He has no specific complaints.          Review of Systems   Constitutional: Negative.    HENT: Negative.    Eyes: Negative.    Respiratory: Negative.    Cardiovascular: Negative.    Gastrointestinal: Negative.    Genitourinary: Negative.    Musculoskeletal: Negative.    Skin: Negative.    Neurological: Negative.    Psychiatric/Behavioral: Negative.        Past Medical History:   Diagnosis Date   • Anxiety    • Depression    • Drug addiction in remission (CMS/[facility])        No Known Allergies    History reviewed. No pertinent surgical history.    Family History   Problem Relation Age of Onset   • No Known Problems Mother    • No Known Problems Father        Social History     Socioeconomic History   • Marital status: Single     Spouse name: Not on file   • Number of children: Not on file   • Years of education: Not on file   • Highest education level: Not on file   Tobacco Use   • Smoking status: Current Every Day Smoker     Packs/day: 0.50     Types: Cigarettes   • Smokeless tobacco: Never Used   Substance and Sexual Activity   • Alcohol use: No     Frequency: Never   • Drug use: Yes           Objective   Physical Exam   Constitutional: He is oriented to person, place, and time. He appears well-developed and well-nourished. No distress.   HENT:   Head: Normocephalic and atraumatic.   Right Ear: External ear normal.   Left Ear: External ear normal.   Nose: Nose normal.   Mouth/Throat: Oropharynx is clear and moist.   Eyes: Pupils are equal, round, and reactive to light. Conjunctivae and EOM are normal.   Neck: Normal range of " motion. Neck supple.   Cardiovascular: Regular rhythm, normal heart sounds and intact distal pulses.   Mild tachycardia   Pulmonary/Chest: Effort normal and breath sounds normal. No stridor. No respiratory distress. He has no wheezes. He has no rales.   Abdominal: Soft. Bowel sounds are normal. He exhibits no distension. There is no tenderness. There is no rebound and no guarding.   Musculoskeletal: Normal range of motion. He exhibits no edema, tenderness or deformity.   Neurological: He is alert and oriented to person, place, and time.   Skin: Skin is warm and dry. No rash noted.   Psychiatric: He has a normal mood and affect. His behavior is normal.   Nursing note and vitals reviewed.      Procedures           ED Course                                           MDM  Number of Diagnoses or Management Options  Accidental overdose of heroin, initial encounter (CMS/[facility]):   Acute respiratory failure with hypoxia (CMS/[facility]):   Diagnosis management comments: 34-year-old male with presumed opiate overdose.  Well-developed, well-nourished man in no distress with exam as above.  He is mildly tachycardic.  He is much more awake after the additional 2 mg of Narcan here.  We will continue to monitor.  Of note he was mildly hypoxic initially so will obtain plain film chest x-ray.  Disposition pending.    DDX: Heroin overdose, aspiration, polysubstance abuse    01:27 Chest x-ray monitor potassium reveals no obvious acute abnormality.  Patient is remained awake and alert though anytime he takes his oxygen off he desaturates fairly quickly.  He also had an episode of vomiting.  I wonder if he has aspirated tonight.  Will obtain basic labs and EKG.  We will continue to monitor for a short time, he may need admission for observation.    EKG interpreted by me: Sinus rhythm, normal rate, right axis, no acute ST/T changes, this is an abnormal EKG    02:39 Patient has been monitored for over 2 hours now, his lab work is notable for  leukocytosis.  He still quickly desaturates when he is not on supplemental oxygen.  Discussed with Dr. Lebron, will order antibiotics to cover for likely aspiration, COVID-19 testing.  Will admit for further observation.  Patient is agreeable with plan of care.      Final diagnoses:   Accidental overdose of heroin, initial encounter (CMS/[facility])   Acute respiratory failure with hypoxia (CMS/[facility])            Porter Salguero MD  05/12/20 4484

## 2020-05-12 NOTE — PROGRESS NOTES
[facility]    Name:  Elvis Joiner   Age:  34 y.o.  Sex:  male  :  1985  MRN:  [id number]   Visit Number:  [id number]  Admission Date:  2020  Date Of Service:  20  Primary Care Physician:  Pilo Domingo MD     LOS: 0 days :  Patient Care Team:  Pilo Domingo MD as PCP - General (Internal Medicine):      Subjective / Interval History:     34-year-old patient who has been admitted because of hypoxic respiratory failure with aspiration pneumonitis and overdose of unknown drug.  He has who can pain is feeling better still hypoxic with saturation on 2 L now in the 90s.  He has been afebrile though has white count and x-ray shows possible early pneumonia.  Patient has no fever there is no cough he has been tested for COVID and is being ruled out      Vital Signs:    Temp:  [94.7 °F (34.8 °C)-97.5 °F (36.4 °C)] 94.7 °F (34.8 °C)  Heart Rate:  [] 98  Resp:  [16-18] 16  BP: (104-138)/(58-95) 107/70    Intake and output:    I/O last 3 completed shifts:  In: 514 [I.V.:514]  Out: 300 [Urine:300]  No intake/output data recorded.    Physical Examination:    General Appearance:    Alert and cooperative, not in any acute distress.   Head:    Atraumatic and normocephalic, without obvious abnormality.   Eyes:            PERRLA,  No pallor. Extraocular movements are within normal limits.   Neck:   Supple,  No lymph glands, no bruit   Lungs:     Chest shape is normal. Breath sounds heard bilaterally equally.  No crackles or wheezing.     Heart:    Normal S1 and S2, no murmur,  No JVD   Abdomen:     Normal bowel sounds, no masses, no organomegaly. Soft     non-tender, no guarding, no rebound tenderness   Extremities:   Moves all extremities well, no edema, no cyanosis,    Skin:   No  bruising or rash.   Neurologic:   Grossly nonfocal and moves all extremities.      Laboratory results:  Results from last 7 days   Lab Units 20  0258 20  0117    SODIUM mmol/L 140 141   POTASSIUM mmol/L 5.2 4.3   CHLORIDE mmol/L 102 102   CO2 mmol/L 24.2 24.6   BUN mg/dL 20 19   CREATININE mg/dL 1.17 1.11   CALCIUM mg/dL 9.9 9.8   BILIRUBIN mg/dL 0.6 0.4   ALK PHOS U/L 142* 127*   ALT (SGPT) U/L 23 18   AST (SGOT) U/L 32 28   GLUCOSE mg/dL 110* 147*     Results from last 7 days   Lab Units 05/12/20  0258 05/12/20  0117   WBC 10*3/mm3 27.77* 26.38*   HEMOGLOBIN g/dL 18.6* 17.2   HEMATOCRIT % 53.2* 48.9   PLATELETS 10*3/mm3 259 252         Results from last 7 days   Lab Units 05/12/20  0117   TROPONIN T ng/mL <0.010           Radiology results:    Imaging Results (Last 24 Hours)     Procedure Component Value Units Date/Time    XR Chest 1 View [[id number]] Collected:  05/12/20 0855     Updated:  05/12/20 0902    Narrative:       PORTABLE CHEST     INDICATION: Overdose. Hypoxia.     FINDINGS: Single frontal portable chest compared with 12/07/2016. EKG  leads overlie the chest. Heart size is normal. No pneumothorax. No  effusion. There are some patchy airspace opacities in the right greater  than left lung.       Impression:       Patchy airspace opacities right greater than left lung which  could represent developing infiltrates from pneumonia. Mild fluid  overload/edema not excluded. Follow-up to resolution recommended.     This report was finalized on 5/12/2020 9:00 AM by Jose M Leal MD.    CT Chest Pulmonary Embolism [[id number]] Collected:  05/12/20 0801     Updated:  05/12/20 0809    Narrative:       PROCEDURE: CT CHEST PULMONARY EMBOLISM-     HISTORY: Hypoxia and elevated d-dimer.     The patient was injected with intravenous contrast. Axial images were  obtained through the chest in a PE protocol. Maximum intensity  projection reconstruction images were also performed. This study was  performed with techniques to keep radiation doses as low as reasonably  achievable, (ALARA). Individualized dose reduction techniques using  automated exposure control or adjustment of mA  and/or kV according to  the patient size were employed.        FINDINGS: No previous CT. Contrast bolus is somewhat heterogeneous. At  the time of image acquisition, contrast in the aorta was similar to  slightly more dense than contrast in the pulmonary arteries. Allowing  for this, no pulmonary embolism identified. No aortic dissection.  Scattered calcifications, likely due to old granulomatous disease. Heart  size is normal. Scattered normal size lymph nodes without adenopathy by  size criteria. There is a right hilar lymph node which is near the upper  limits of normal. No effusions. Visualized portions of the upper abdomen  demonstrate no acute abnormality. No pneumothorax. Multifocal bilateral  ground glass and airspace infiltrates are identified. These are slightly  greater towards the right and involve the upper lobes and lower lobes.  There is only trace involvement of the right middle lobe and lingula.       Impression:       Bilateral infiltrates most concerning for multifocal  pneumonia. Edema thought to be much less likely. Short-term follow-up to  resolution recommended.         This report was finalized on 5/12/2020 8:07 AM by Jose M Leal MD.          I have reviewed the patient's radiology reports.    Medication Review:     I have reviewed the patients active and prn medications.     Assessment:      Acute respiratory failure with hypoxia (CMS/HCC)    Accidental overdose of heroin (CMS/HCC)    Leukocytosis    Non-intractable vomiting    Elevated d-dimer    Hyperglycemia    Sepsis, unspecified organism (CMS/HCC)    Tachycardia    Suspected Covid-19 Virus Infection    Aspiration pneumonia due to vomitus (CMS/HCC)          Plan:    1.  Acute hypoxic respiratory failure-this seems to be because of possible aspiration pneumonitis    2.  Bilateral pneumonia and this seems to be aspiration related and continue with IV antibiotic at present COVID being ruled out.      3.  Overdose of recreational drug-he  thought he had heroin but the drug screen did not show heroin and he has been on amphetamines and benzos which was positive in his system but that was prescribed as per the patient and no other drugs except marijuana noted    4.  Sepsis this is simple sepsis and is stable and follow-up on white count    Pilo Domingo MD  05/12/20  09:10      Please note that portions of this note may have been completed with a voice recognition program. Efforts were made to edit the dictations, but occasionally words are mistranscribed.

## 2020-05-12 NOTE — H&P
"    [facility]  HISTORY AND PHYSICAL    Patient Name: Elvis Joiner  : 1985  MRN: [id number]  Primary Care Physician: Pilo Domingo MD  Date of admission: 2020      Subjective   Subjective     Chief Complaint:  Hypoxic     HPI:  Elvis Joiner is a 34 y.o. male with past medical history of ADHD and depression and past history of heroin addiction.  Patient was brought to [facility] ED by EMS after being found unresponsive by his fiancée.  Patient states he has a history of heroin addiction and has previously been through inpatient treatment. He was on Suboxone therapy in the past. He attended sober living in 2018.  And states that he spent greater than 30 days in rehab in 2018.  Patient states that tonight his daughter had stayed with his mother and he was visiting with his previous sponsor.  He states that he was concerned about his friends mental health and noted that his friend was injecting heroin in the bathroom.  Patient states he had already been drinking some beer and had taken a \"bar of Xanax \".  He states due to his history of opiate addiction he relapsed after seeing his friend injecting heroin.  According to ED staff patient was noted to be in the bathroom vomiting prior to EMS arrival.  He was given 4 mg of Narcan by EMS and required 2 mg of additional Narcan upon arrival to the ED.  Patient continued to be hypoxic with sats dropping as low as 84%.  Therefore the decision was made to admit patient for further observation and evaluation of his hypoxia.        COVID-19 RISK SCREEN     1. Has the patient had close contact without PPE with a lab confirmed COVID-19 (+) person or a person under investigation (PUI) for COVID-19 infection?  -- No     2. Has the patient had respiratory symptoms, worsened/new cough and/or SOA, unexplained fever, or sudden loss of smell and/or taste in the past 7 days? --  Yes, mild cough, " mildly productive, hypoxic     3. Does the patient have baseline higher exposure risk such as working in healthcare field, currently residing in healthcare facility, or ongoing hemodialysis?  --  No           Review of Systems   Constitutional: Positive for fatigue. Negative for chills and fever.   HENT: Negative for congestion, ear pain, rhinorrhea and sore throat.    Respiratory: Positive for cough (yellow expectorant ) and shortness of breath. Negative for wheezing.    Cardiovascular: Negative for chest pain, palpitations and leg swelling.   Gastrointestinal: Negative for abdominal pain, blood in stool, constipation, diarrhea, nausea and vomiting.   Genitourinary: Negative for dysuria and hematuria.   Musculoskeletal: Negative.  Negative for arthralgias and back pain.   Skin: Negative.  Negative for rash.   Neurological: Positive for dizziness and light-headedness. Negative for headaches.   Psychiatric/Behavioral: Negative for dysphoric mood and sleep disturbance. The patient is nervous/anxious.         All other systems reviewed and are negative.     Personal History     Past Medical History:   Diagnosis Date   • ADHD    • Anxiety    • Depression    • Drug addiction in remission (CMS/[facility])        Past Surgical History:   Procedure Laterality Date   • SALIVARY GLAND EXCISION            Family History: family history includes Depression in his father; No Known Problems in his mother. Otherwise pertinent FHx was reviewed and unremarkable.     Social History:  reports that he has been smoking cigarettes. He has been smoking about 0.50 packs per day. He has never used smokeless tobacco. He reports that he drinks alcohol. He reports that he has current or past drug history. Drugs: Benzodiazepines and Heroin.  Social History     Social History Narrative   • Not on file       Medications:  Available home medication information reviewed.    No current facility-administered medications on file prior to encounter.           Current Outpatient Medications on File Prior to Encounter   Medication Sig Dispense Refill   • amphetamine-dextroamphetamine (ADDERALL) 20 MG tablet Take 20 mg by mouth 2 (Two) Times a Day. At lunch and dinner  0   • buPROPion (WELLBUTRIN) 100 MG tablet Take 300 mg by mouth Daily.     • Multiple Vitamins-Minerals (MULTIVITAMIN WITH MINERALS) tablet tablet Take 1 tablet by mouth Daily.     • venlafaxine XR (EFFEXOR-XR) 37.5 MG 24 hr capsule Take 150 mg by mouth Daily.  2   • VYVANSE 50 MG capsule Take 70 mg by mouth Every Morning    0   • buprenorphine-naloxone (SUBOXONE) 8-2 MG film film Place  under the tongue.     • cetirizine (zyrTEC) 10 MG tablet Take 1 tablet by mouth Daily. 30 tablet 0   • methylPREDNISolone (MEDROL, SYD,) 4 MG tablet Take as directed on package instructions. 21 tablet 0   • naltrexone (DEPADE) 50 MG tablet Take 50 mg by mouth Daily As Needed.  2   • NARCAN 4 MG/0.1ML nasal spray USE 1 SPRAY INTRANSALLY 1 TIME PER DAY FOR OVERDOSE - REPEAT EVERY 3 MIN AS NEEDED ALTERNATE NARES  1   wellbutrin 300 mg XL   effexor 150 mg    No Known Allergies    Objective   Objective     Vital Signs:   Temp:  [97.5 °F (36.4 °C)] 97.5 °F (36.4 °C)  Heart Rate:  [105] 105  Resp:  [16] 16  BP: (115)/(75) 115/75    O2sat 84% on RA    Physical Exam   Constitutional: He is oriented to person, place, and time. He appears well-nourished. No distress.   HENT:   Head: Atraumatic.   Right Ear: External ear normal.   Left Ear: External ear normal.   Eyes: Conjunctivae are normal. Right eye exhibits no discharge. Left eye exhibits no discharge.   Cardiovascular: Normal rate and regular rhythm.   No murmur heard.  Pulmonary/Chest: Effort normal and breath sounds normal. He has no wheezes. He has no rales.   Abdominal: Soft. Bowel sounds are normal. He exhibits no distension. There is no tenderness.   Musculoskeletal: He exhibits no edema.   Neurological: He is alert and oriented to person, place, and time.   Skin: No rash  noted. He is not diaphoretic.   No wounds noted on feet    Psychiatric: He has a normal mood and affect.   Nursing note and vitals reviewed.     PPE worn during exam included N95 and surgical mask, Gloves, and gown and face shield.       Results Reviewed:  I have personally reviewed current lab and radiology data.    Results from last 7 days   Lab Units 05/12/20  0117   WBC 10*3/mm3 26.38*   HEMOGLOBIN g/dL 17.2   HEMATOCRIT % 48.9   PLATELETS 10*3/mm3 252     Results from last 7 days   Lab Units 05/12/20  0117   SODIUM mmol/L 141   POTASSIUM mmol/L 4.3   CHLORIDE mmol/L 102   CO2 mmol/L 24.6   BUN mg/dL 19   CREATININE mg/dL 1.11   GLUCOSE mg/dL 147*   CALCIUM mg/dL 9.8   ALT (SGPT) U/L 18   AST (SGOT) U/L 28   TROPONIN T ng/mL <0.010   PROBNP pg/mL 10.2     Estimated Creatinine Clearance: 110.3 mL/min (by C-G formula based on SCr of 1.11 mg/dL).  Brief Urine Lab Results     None        Imaging Results (Last 24 Hours)     Procedure Component Value Units Date/Time    XR Chest 1 View [[id number]] Resulted:  05/11/20 2348     Updated:  05/11/20 2348             Assessment/Plan   Assessment & Plan     Active Hospital Problems    Diagnosis POA   • **Acute respiratory failure with hypoxia (CMS/HCC) [J96.01] Unknown   • Accidental overdose of heroin (CMS/HCC) [T40.1X1A] Yes     Priority: High   • Sepsis, unspecified organism (CMS/HCC) [A41.9] Unknown     Priority: High   • Leukocytosis [D72.829] Unknown   • Non-intractable vomiting [R11.10] Unknown   • Elevated d-dimer [R79.89] Unknown   • Hyperglycemia [R73.9] Unknown   • Tachycardia [R00.0] Unknown     PLAN:  -Patient is a 34-year-old white male brought to [facility] ED by EMS after he was found unresponsive by his girlfriend.  Patient with presumed opiate overdose upon arrival to the ED noted to be hypoxic with pulse ox 84-85%.  Requiring supplemental oxygen.  He was given 4 mg of Narcan by EMS and 2 mg upon arrival to the ED.  Patient will be admitted to  the ICU for observation due to acute respiratory failure.Concern for aspiration PNA. CHEST XRay negative, will further evaluate with CT     -Patient will be admitted to the ICU for close observation due to presumed opiate overdose and hypoxia. He will be placed in contact and airborne isolation due to lack of social distancing and hypoxia. No known Covid exposure however he has WBC of 26,000, elevated LDH and positive D-dimer.     -Patient with elevated d-dimer will obtain CT angiogram per PE protocol    - Hyperglycemia will check A1c and monitor fingerstick blood sugars    -Tachycardia-check magnesium level and trend trop and hydrate with normal saline.     - Mild sepsis we will obtain blood cultures and lactic acid.    - We will consult case management/ in the a.m. due to heroin overdose and patient currently having custody of his daughter.      DVT prophylaxis:  lovenox and SCDs      Admission Communication  Due to current limited visitation policies, an attempt will be made to update patient's identified best point-of-contact(s) at admission to discuss plan of care.   Contact: N/A   Relation: N/A   Time of communication: N/A   Notes (if applicable): N/A         CODE STATUS:    Code Status and Medical Interventions:   Ordered at: 05/12/20 0359     Code Status:    CPR     Medical Interventions (Level of Support Prior to Arrest):    Full       Admission Status:  I believe this patient meets OBSERVATION status, however if further evaluation or treatment plans warrant, status may change.  Based upon current information, I predict patient's care encounter to be less than or equal to 2 midnights.      Electronically signed by Carolina Lebron DO, 05/12/20, 2:39 AM.

## 2020-05-12 NOTE — PLAN OF CARE
Problem: Pneumonia (Adult)  Goal: Signs and Symptoms of Listed Potential Problems Will be Absent, Minimized or Managed (Pneumonia)  Outcome: Ongoing (interventions implemented as appropriate)  Flowsheets (Taken 5/12/2020 3769)  Problems Assessed (Pneumonia): all  Problems Present (Pneumonia): respiratory compromise

## 2020-05-12 NOTE — PLAN OF CARE
Problem: Overdose, Ingestion/Inhalants (Adult)  Goal: Signs and Symptoms of Listed Potential Problems Will be Absent, Minimized or Managed (Overdose, Ingestion/Inhalants)  Outcome: Outcome(s) achieved     Problem: Patient Care Overview  Goal: Plan of Care Review  Outcome: Outcome(s) achieved   Pt alert, talkative.  Awaiting results of ct scan.  Pt without complaints.

## 2020-05-12 NOTE — PROGRESS NOTES
Discharge Planning Assessment   Ricky     Patient Name: Elvis Joiner  MRN: [id number]  Today's Date: 5/12/2020    Admit Date: 5/11/2020    Discharge Needs Assessment     Row Name 05/12/20 1304       Living Environment    Unique Family Situation  dad is next of kin.    Primary Care Provided by  self;spouse/significant other;parent(s)    Provides Primary Care For  child(kristyn)    Family Caregiver if Needed  significant other;parent(s)       Discharge Needs Assessment    Readmission Within the Last 30 Days  no previous admission in last 30 days    Concerns to be Addressed  discharge planning    Outpatient/Agency/Support Group Needs  other (see comments)    Provided Post Acute Provider List?  N/A    N/A Provider List Comment  n/a    Current Discharge Risk  other (see comments)    Discharge Coordination/Progress  home        Discharge Plan     Row Name 05/12/20 1396       Plan    Plan  Discharge plan, verified address and PCP. Lives with significant other and his 2 year old child. No living will or POA. Will need substance abuse tx information. Currently laid off. No medication access or transportation issues. Will go home with girlfriend.        Destination      Coordination has not been started for this encounter.      Durable Medical Equipment      Coordination has not been started for this encounter.      Dialysis/Infusion      Coordination has not been started for this encounter.      Home Medical Care      Coordination has not been started for this encounter.      Therapy      Coordination has not been started for this encounter.      Community Resources      Coordination has not been started for this encounter.        Expected Discharge Date and Time     Expected Discharge Date Expected Discharge Time    May 15, 2020         Demographic Summary     Row Name 05/12/20 0929       General Information    Admission Type  inpatient    Arrived From  home    Expected Length of Stay (LOS)  3    Referral Source   admission list    Reason for Consult  discharge planning    Preferred Language  English       Contact Information    Contact Information Comments  dad is next of kin        Functional Status     Row Name 05/12/20 1306       Functional Status, IADL    Medications  independent    Meal Preparation  independent    Housekeeping  independent    Laundry  independent    Shopping  independent    IADL Comments  normally independent and working, laid off currently.       Employment/    Employment Status  unemployed        Psychosocial    No documentation.       Abuse/Neglect    No documentation.       Legal    No documentation.       Substance Abuse    No documentation.       Patient Forms    No documentation.           Katrin Tanner LCSW  Continued Stay Note  MARC García     Patient Name: Elvis Joiner  MRN: [id number]  Today's Date: 5/12/2020    Admit Date: 5/11/2020    Discharge Plan     Row Name 05/12/20 1310       Plan    Plan  Discharge plan, verified address and PCP. Lives with significant other and his 2 year old child. No living will or POA. Will need substance abuse tx information. Currently laid off. No medication access or transportation issues. Will go home with girlfriend.        Discharge Codes    No documentation.       Expected Discharge Date and Time     Expected Discharge Date Expected Discharge Time    May 15, 2020             Katrin Tanner LCSW

## 2020-05-12 NOTE — PROGRESS NOTES
"Adult Nutrition  Assessment/PES    Patient Name:  Elvis Joiner  YOB: 1985  MRN: [id number]  Admit Date:  5/11/2020    Assessment Date:  5/12/2020    Comments:    Recommend:  1. Continue current diet order as medically appropriate and tolerated.  2. Establish and encourage PO intake.  3. RD ordered Boost Plus and Boost Pudding daily.  4. Consider a multivitamin with minerals daily.     RD to follow pt and available PRN.    Reason for Assessment     Row Name 05/12/20 0900          Reason for Assessment    Reason For Assessment  diagnosis/disease state     Diagnosis  substance use/abuse;infection/sepsis     Identified At Risk by Screening Criteria  BMI           Anthropometrics     Row Name 05/12/20 0345          Anthropometrics    Height  175.3 cm (69\")     Weight  92.6 kg (204 lb 3.2 oz)        Ideal Body Weight (IBW)    Ideal Body Weight (IBW) (kg)  73.69     % Ideal Body Weight  125.7        Body Mass Index (BMI)    BMI (kg/m2)  30.22         Labs/Tests/Procedures/Meds     Row Name 05/12/20 0901          Labs/Procedures/Meds    Lab Results Reviewed  reviewed, pertinent     Lab Results Comments  Low: Procal High: Alb        Medications    Pertinent Medications Reviewed  reviewed         Physical Findings     Row Name 05/12/20 0901          Physical Findings    Overall Physical Appearance  obese         Estimated/Assessed Needs     Row Name 05/12/20 0902 05/12/20 0345       Calculation Measurements    Weight Used For Calculations  76.2 kg (167 lb 15 oz) Adjusted BW  --    Height  --  175.3 cm (69\")       Estimated/Assessed Needs    Additional Documentation  Fluid Requirements (Group);Range-St. Jeor Equation (Group);Protein Requirements (Group);Calorie Requirements (Group)  --       Calorie Requirements    Estimated Calorie Need Method  Range-St Jeor  --    Estimated Calorie Requirement Comment  0036 - 4720 AF 1.3  --       Range-St. Jeor Equation    RMR (Range-St. Jeor Equation)  " 1702.125  --       Protein Requirements    Weight Used For Protein Calculations  92.6 kg (204 lb 2.3 oz) Actual BW  --    Est Protein Requirement Amount (gms/kg)  2.0 gm protein 139 - 185 gm  --    Estimated Protein Requirements (gms/day)  185.2  --       Fluid Requirements    Estimated Fluid Requirement Method  Lucy-Segar Formula  --    Cullman-Segar Method (over 20 kg)  3023.5  --        Nutrition Prescription Ordered     Row Name 05/12/20 0903          Nutrition Prescription PO    Current PO Diet  Regular         Evaluation of Received Nutrient/Fluid Intake     Row Name 05/12/20 0902          PO Evaluation    Number of Days PO Intake Evaluated  Insufficient Data               Problem/Interventions:  Problem 1     Row Name 05/12/20 0903          Nutrition Diagnoses Problem 1    Problem 1  Increased Nutrient Needs     Macronutrient  Kcal;Protein;Fluid     Etiology (related to)  Medical Diagnosis     Infectious Disease  Sepsis     Signs/Symptoms (evidenced by)  Other (comment) Mild infection noted         Problem 2     Row Name 05/12/20 0904          Nutrition Diagnoses Problem 2    Problem 2  Overweight/Obesity     Etiology (related to)  Factors Affecting Nutrition     Food Habit/Preferences  Large Meals     Signs/Symptoms (evidenced by)  BMI     BMI  30 - 34.9             Intervention Goal     Row Name 05/12/20 0904          Intervention Goal    General  Meet nutritional needs for age/condition     PO  Meet estimated needs;Establish PO;PO intake (%)     PO Intake %  50 %     Weight  No significant weight loss         Nutrition Intervention     Row Name 05/12/20 0904          Nutrition Intervention    RD/Tech Action  Follow Tx progress;Encourage intake;Recommend/ordered     Recommended/Ordered  Supplement         Nutrition Prescription     Row Name 05/12/20 0904          Nutrition Prescription PO    PO Prescription  Begin/change supplement;Other (comment) Continue current diet order as medically appropriate  and tolerated     Supplement  Boost Plus;Boost Pudding     Supplement Frequency  Daily     New PO Prescription Ordered?  No, recommended        Other Orders    Obtain Weight  Daily     Obtain Weight Ordered?  No, recommended     Supplement  Vitamin mineral supplement     Supplement Ordered?  No, recommended         Education/Evaluation     Row Name 05/12/20 0905          Education    Education  Education not appropriate at this time     Please explain  Defer until post ICU        Monitor/Evaluation    Monitor  Per protocol;I&O;PO intake;Supplement intake;Pertinent labs;Weight;Skin status           Electronically signed by:  Adilene Hobbs RD  05/12/20 09:05

## 2020-05-12 NOTE — PAYOR COMM NOTE
"TO:HUMANA   FROM:CAMILLE GRANDE RN PHONE [phone]  IN CLINICALS REF# [id number]    Elvis De Dios (34 y.o. Male)     Date of Birth Social Security Number Address Home Phone MRN    1985  [address]  [facility] [address] [phone] [id number]    Yazidism Marital Status          [facility] Single       Admission Date Admission Type Admitting Provider Attending Provider Department, Room/Bed    20 Emergency Carolina Lebron DO Shah, Gaurang B, MD [facility] INTENSIVE CARE, I03/    Discharge Date Discharge Disposition Discharge Destination                       Attending Provider:  Pilo Domingo MD    Allergies:  No Known Allergies    Isolation:  Enh Drop/Con   Infection:  COVID (rule out) (20)   Code Status:  CPR    Ht:  175.3 cm (69\")   Wt:  92.6 kg (204 lb 3.2 oz)    Admission Cmt:  None   Principal Problem:  Acute respiratory failure with hypoxia (CMS/HCC) [J96.01]                 Active Insurance as of 2020     Primary Coverage     Payor Plan Insurance Group Employer/Plan Group    [place] [id number]     Payor Plan Address Payor Plan Phone Number Payor Plan Fax Number Effective Dates    [employer] Claims Office - [address] [address] [phone]  2020 - None Entered    [facility] [id number]       Subscriber Name Subscriber Birth Date Member ID       ELVIS DE DIOS 1985 [id number]                 Emergency Contacts      (Rel.) Home Phone Work Phone Mobile Phone    Moe De Dios (Father) [phone] -- --    EMILIO PAGAN (Significant Other) [phone] -- --               History & Physical      Carolina Lebron DO at 20 0239              [facility] Medicine Services  HISTORY AND PHYSICAL    Patient Name: Elvis De Dios  : 1985  MRN: [id number]  Primary Care Physician: Pilo Domingo MD  Date of admission: 2020      Subjective   Subjective     Chief " "Complaint:  Hypoxic     HPI:  Elvis Joiner is a 34 y.o. male with past medical history of ADHD and depression and past history of heroin addiction.  Patient was brought to [facility] ED by EMS after being found unresponsive by his fiancée.  Patient states he has a history of heroin addiction and has previously been through inpatient treatment. He was on Suboxone therapy in the past. He attended sober living in 2018.  And states that he spent greater than 30 days in rehab in March 2018.  Patient states that tonight his daughter had stayed with his mother and he was visiting with his previous sponsor.  He states that he was concerned about his friends mental health and noted that his friend was injecting heroin in the bathroom.  Patient states he had already been drinking some beer and had taken a \"bar of Xanax \".  He states due to his history of opiate addiction he relapsed after seeing his friend injecting heroin.  According to ED staff patient was noted to be in the bathroom vomiting prior to EMS arrival.  He was given 4 mg of Narcan by EMS and required 2 mg of additional Narcan upon arrival to the ED.  Patient continued to be hypoxic with sats dropping as low as 84%.  Therefore the decision was made to admit patient for further observation and evaluation of his hypoxia.        COVID-19 RISK SCREEN     1. Has the patient had close contact without PPE with a lab confirmed COVID-19 (+) person or a person under investigation (PUI) for COVID-19 infection?  -- No     2. Has the patient had respiratory symptoms, worsened/new cough and/or SOA, unexplained fever, or sudden loss of smell and/or taste in the past 7 days? --  Yes, mild cough, mildly productive, hypoxic     3. Does the patient have baseline higher exposure risk such as working in healthcare field, currently residing in healthcare facility, or ongoing hemodialysis?  --  No           Review of Systems   Constitutional: Positive for fatigue. " Negative for chills and fever.   HENT: Negative for congestion, ear pain, rhinorrhea and sore throat.    Respiratory: Positive for cough (yellow expectorant ) and shortness of breath. Negative for wheezing.    Cardiovascular: Negative for chest pain, palpitations and leg swelling.   Gastrointestinal: Negative for abdominal pain, blood in stool, constipation, diarrhea, nausea and vomiting.   Genitourinary: Negative for dysuria and hematuria.   Musculoskeletal: Negative.  Negative for arthralgias and back pain.   Skin: Negative.  Negative for rash.   Neurological: Positive for dizziness and light-headedness. Negative for headaches.   Psychiatric/Behavioral: Negative for dysphoric mood and sleep disturbance. The patient is nervous/anxious.         All other systems reviewed and are negative.     Personal History     Past Medical History:   Diagnosis Date   • ADHD    • Anxiety    • Depression    • Drug addiction in remission (CMS/[facility])        Past Surgical History:   Procedure Laterality Date   • SALIVARY GLAND EXCISION            Family History: family history includes Depression in his father; No Known Problems in his mother. Otherwise pertinent FHx was reviewed and unremarkable.     Social History:  reports that he has been smoking cigarettes. He has been smoking about 0.50 packs per day. He has never used smokeless tobacco. He reports that he drinks alcohol. He reports that he has current or past drug history. Drugs: Benzodiazepines and Heroin.  Social History     Social History Narrative   • Not on file       Medications:  Available home medication information reviewed.    No current facility-administered medications on file prior to encounter.      Current Outpatient Medications on File Prior to Encounter   Medication Sig Dispense Refill   • amphetamine-dextroamphetamine (ADDERALL) 20 MG tablet Take 20 mg by mouth 2 (Two) Times a Day. At lunch and dinner  0   • buPROPion (WELLBUTRIN) 100 MG tablet Take 300 mg by  mouth Daily.     • Multiple Vitamins-Minerals (MULTIVITAMIN WITH MINERALS) tablet tablet Take 1 tablet by mouth Daily.     • venlafaxine XR (EFFEXOR-XR) 37.5 MG 24 hr capsule Take 150 mg by mouth Daily.  2   • VYVANSE 50 MG capsule Take 70 mg by mouth Every Morning    0   • buprenorphine-naloxone (SUBOXONE) 8-2 MG film film Place  under the tongue.     • cetirizine (zyrTEC) 10 MG tablet Take 1 tablet by mouth Daily. 30 tablet 0   • methylPREDNISolone (MEDROL, SYD,) 4 MG tablet Take as directed on package instructions. 21 tablet 0   • naltrexone (DEPADE) 50 MG tablet Take 50 mg by mouth Daily As Needed.  2   • NARCAN 4 MG/0.1ML nasal spray USE 1 SPRAY INTRANSALLY 1 TIME PER DAY FOR OVERDOSE - REPEAT EVERY 3 MIN AS NEEDED ALTERNATE NARES  1   wellbutrin 300 mg XL   effexor 150 mg    No Known Allergies    Objective   Objective     Vital Signs:   Temp:  [97.5 °F (36.4 °C)] 97.5 °F (36.4 °C)  Heart Rate:  [105] 105  Resp:  [16] 16  BP: (115)/(75) 115/75    O2sat 84% on RA    Physical Exam   Constitutional: He is oriented to person, place, and time. He appears well-nourished. No distress.   HENT:   Head: Atraumatic.   Right Ear: External ear normal.   Left Ear: External ear normal.   Eyes: Conjunctivae are normal. Right eye exhibits no discharge. Left eye exhibits no discharge.   Cardiovascular: Normal rate and regular rhythm.   No murmur heard.  Pulmonary/Chest: Effort normal and breath sounds normal. He has no wheezes. He has no rales.   Abdominal: Soft. Bowel sounds are normal. He exhibits no distension. There is no tenderness.   Musculoskeletal: He exhibits no edema.   Neurological: He is alert and oriented to person, place, and time.   Skin: No rash noted. He is not diaphoretic.   No wounds noted on feet    Psychiatric: He has a normal mood and affect.   Nursing note and vitals reviewed.     PPE worn during exam included N95 and surgical mask, Gloves, and gown and face shield.       Results Reviewed:  I have  personally reviewed current lab and radiology data.    Results from last 7 days   Lab Units 05/12/20  0117   WBC 10*3/mm3 26.38*   HEMOGLOBIN g/dL 17.2   HEMATOCRIT % 48.9   PLATELETS 10*3/mm3 252     Results from last 7 days   Lab Units 05/12/20  0117   SODIUM mmol/L 141   POTASSIUM mmol/L 4.3   CHLORIDE mmol/L 102   CO2 mmol/L 24.6   BUN mg/dL 19   CREATININE mg/dL 1.11   GLUCOSE mg/dL 147*   CALCIUM mg/dL 9.8   ALT (SGPT) U/L 18   AST (SGOT) U/L 28   TROPONIN T ng/mL <0.010   PROBNP pg/mL 10.2     Estimated Creatinine Clearance: 110.3 mL/min (by C-G formula based on SCr of 1.11 mg/dL).  Brief Urine Lab Results     None        Imaging Results (Last 24 Hours)     Procedure Component Value Units Date/Time    XR Chest 1 View [[id number]] Resulted:  05/11/20 2348     Updated:  05/11/20 2348             Assessment/Plan   Assessment & Plan     Active Hospital Problems    Diagnosis POA   • **Acute respiratory failure with hypoxia (CMS/HCC) [J96.01] Unknown   • Accidental overdose of heroin (CMS/HCC) [T40.1X1A] Yes     Priority: High   • Sepsis, unspecified organism (CMS/HCC) [A41.9] Unknown     Priority: High   • Leukocytosis [D72.829] Unknown   • Non-intractable vomiting [R11.10] Unknown   • Elevated d-dimer [R79.89] Unknown   • Hyperglycemia [R73.9] Unknown   • Tachycardia [R00.0] Unknown     PLAN:  -Patient is a 34-year-old white male brought to [facility] ED by EMS after he was found unresponsive by his girlfriend.  Patient with presumed opiate overdose upon arrival to the ED noted to be hypoxic with pulse ox 84-85%.  Requiring supplemental oxygen.  He was given 4 mg of Narcan by EMS and 2 mg upon arrival to the ED.  Patient will be admitted to the ICU for observation due to acute respiratory failure.Concern for aspiration PNA. CHEST XRay negative, will further evaluate with CT     -Patient will be admitted to the ICU for close observation due to presumed opiate overdose and hypoxia. He will be placed in  "contact and airborne isolation due to lack of social distancing and hypoxia. No known Covid exposure however he has WBC of 26,000, elevated LDH and positive D-dimer.     -Patient with elevated d-dimer will obtain CT angiogram per PE protocol    - Hyperglycemia will check A1c and monitor fingerstick blood sugars    -Tachycardia-check magnesium level and trend trop and hydrate with normal saline.     - Mild sepsis we will obtain blood cultures and lactic acid.    - We will consult case management/ in the a.m. due to heroin overdose and patient currently having custody of his daughter.      DVT prophylaxis:  lovenox and SCDs      Admission Communication  Due to current limited visitation policies, an attempt will be made to update patient's identified best point-of-contact(s) at admission to discuss plan of care.   Contact: N/A   Relation: N/A   Time of communication: N/A   Notes (if applicable): N/A         CODE STATUS:    Code Status and Medical Interventions:   Ordered at: 05/12/20 0359     Code Status:    CPR     Medical Interventions (Level of Support Prior to Arrest):    Full       Admission Status:  I believe this patient meets OBSERVATION status, however if further evaluation or treatment plans warrant, status may change.  Based upon current information, I predict patient's care encounter to be less than or equal to 2 midnights.      Electronically signed by Carolina Lebron DO, 05/12/20, 2:39 AM.      Electronically signed by Carolina Lebron DO at 05/12/20 0642          Emergency Department Notes      Porter Salguero MD at 05/12/20 0011          Subjective   34 year old male presenting with overdose. He states that he did a xanax bar earlier and was with a tee \"doing heroin and acting crazy\" but he does not remember doing heroin himself. EMS was called for patient being unresponsive, he was found to be apneic. He was given 4 mg of Narcan and woke up.  On arrival here he is still somewhat " sleepy and was given additional 2 mg of Narcan.  He has no specific complaints.          Review of Systems   Constitutional: Negative.    HENT: Negative.    Eyes: Negative.    Respiratory: Negative.    Cardiovascular: Negative.    Gastrointestinal: Negative.    Genitourinary: Negative.    Musculoskeletal: Negative.    Skin: Negative.    Neurological: Negative.    Psychiatric/Behavioral: Negative.        Past Medical History:   Diagnosis Date   • Anxiety    • Depression    • Drug addiction in remission (CMS/HCC)        No Known Allergies    History reviewed. No pertinent surgical history.    Family History   Problem Relation Age of Onset   • No Known Problems Mother    • No Known Problems Father        Social History     Socioeconomic History   • Marital status: Single     Spouse name: Not on file   • Number of children: Not on file   • Years of education: Not on file   • Highest education level: Not on file   Tobacco Use   • Smoking status: Current Every Day Smoker     Packs/day: 0.50     Types: Cigarettes   • Smokeless tobacco: Never Used   Substance and Sexual Activity   • Alcohol use: No     Frequency: Never   • Drug use: Yes           Objective   Physical Exam   Constitutional: He is oriented to person, place, and time. He appears well-developed and well-nourished. No distress.   HENT:   Head: Normocephalic and atraumatic.   Right Ear: External ear normal.   Left Ear: External ear normal.   Nose: Nose normal.   Mouth/Throat: Oropharynx is clear and moist.   Eyes: Pupils are equal, round, and reactive to light. Conjunctivae and EOM are normal.   Neck: Normal range of motion. Neck supple.   Cardiovascular: Regular rhythm, normal heart sounds and intact distal pulses.   Mild tachycardia   Pulmonary/Chest: Effort normal and breath sounds normal. No stridor. No respiratory distress. He has no wheezes. He has no rales.   Abdominal: Soft. Bowel sounds are normal. He exhibits no distension. There is no tenderness. There  is no rebound and no guarding.   Musculoskeletal: Normal range of motion. He exhibits no edema, tenderness or deformity.   Neurological: He is alert and oriented to person, place, and time.   Skin: Skin is warm and dry. No rash noted.   Psychiatric: He has a normal mood and affect. His behavior is normal.   Nursing note and vitals reviewed.      Procedures          ED Course                                           MDM  Number of Diagnoses or Management Options  Accidental overdose of heroin, initial encounter (CMS/[facility]):   Acute respiratory failure with hypoxia (CMS/[facility]):   Diagnosis management comments: 34-year-old male with presumed opiate overdose.  Well-developed, well-nourished man in no distress with exam as above.  He is mildly tachycardic.  He is much more awake after the additional 2 mg of Narcan here.  We will continue to monitor.  Of note he was mildly hypoxic initially so will obtain plain film chest x-ray.  Disposition pending.    DDX: Heroin overdose, aspiration, polysubstance abuse    01:27 Chest x-ray monitor potassium reveals no obvious acute abnormality.  Patient is remained awake and alert though anytime he takes his oxygen off he desaturates fairly quickly.  He also had an episode of vomiting.  I wonder if he has aspirated tonight.  Will obtain basic labs and EKG.  We will continue to monitor for a short time, he may need admission for observation.    EKG interpreted by me: Sinus rhythm, normal rate, right axis, no acute ST/T changes, this is an abnormal EKG    02:39 Patient has been monitored for over 2 hours now, his lab work is notable for leukocytosis.  He still quickly desaturates when he is not on supplemental oxygen.  Discussed with Dr. Lebron, will order antibiotics to cover for likely aspiration, COVID-19 testing.  Will admit for further observation.  Patient is agreeable with plan of care.      Final diagnoses:   Accidental overdose of heroin, initial encounter (CMS/[facility])   Acute  respiratory failure with hypoxia (CMS/[facility])            Porter Salguero MD  05/12/20 0240      Electronically signed by Porter Salguero MD at 05/12/20 0240         Current Facility-Administered Medications   Medication Dose Route Frequency Provider Last Rate Last Dose   • albuterol sulfate HFA (PROVENTIL HFA;VENTOLIN HFA;PROAIR HFA) inhaler 2 puff  2 puff Inhalation 4x Daily - RT Carolina Lebron, DO       • buPROPion (WELLBUTRIN) tablet 100 mg  100 mg Oral Daily Pilo Domingo MD       • dextrose (D50W) 25 g/ 50mL Intravenous Solution 25 g  25 g Intravenous Q15 Min PRN ViCarolina tuttle, DO       • dextrose (GLUTOSE) oral gel 1 tube  1 tube Oral Q15 Min PRN Carolina Lebron, DO       • enoxaparin (LOVENOX) syringe 40 mg  40 mg Subcutaneous Q24H Vi, Carolina KALEB, DO       • glucagon (human recombinant) (GLUCAGEN DIAGNOSTIC) injection 1 mg  1 mg Subcutaneous PRN Carolina Lebron, DO       • nicotine (NICODERM CQ) 21 MG/24HR patch 1 patch  1 patch Transdermal Q24H ViCarolina, DO   1 patch at 05/12/20 0503   • ondansetron (ZOFRAN) injection 4 mg  4 mg Intravenous Q6H PRN Carolina Lebron, DO       • Pharmacy to dose piperacillin/tazobactam (ZOSYN)   Does not apply Continuous PRN Carolina Lebron, DO       • Pharmacy to dose vancomycin   Does not apply Continuous PRN Carolina Lebron, DO       • piperacillin-tazobactam (ZOSYN) 4.5 g in iso-osmotic dextrose 100 mL IVPB (premix)  4.5 g Intravenous Q8H ViCarolina, DO       • sodium chloride 0.9 % flush 10 mL  10 mL Intravenous Q12H ViCarolina, DO       • sodium chloride 0.9 % flush 10 mL  10 mL Intravenous PRN Carolina Lebron, DO       • sodium chloride 0.9 % infusion  125 mL/hr Intravenous Continuous ViCarolina tuttle,  mL/hr at 05/12/20 0638 125 mL/hr at 05/12/20 0638   • vancomycin (VANCOCIN) 1,500 mg in sodium chloride 0.9 % 500 mL IVPB  1,500 mg Intravenous Q12H Carolina Lebron DO       • venlafaxine XR (EFFEXOR-XR) 24 hr capsule 150 mg  150 mg Oral Daily With Breakfast  Pilo Domingo MD           Lab Results (last 24 hours)     Procedure Component Value Units Date/Time    MRSA Screen, PCR (Inpatient) - Swab, Nares [[id number]] Collected:  05/12/20 0734    Specimen:  Swab from Nares Updated:  05/12/20 0744    POC Glucose Once [[id number]]  (Normal) Collected:  05/12/20 0643    Specimen:  Blood Updated:  05/12/20 0647     Glucose 126 mg/dL      Comment: Serial Number: [id number]:  [id number]       Legionella Antigen, Urine - Urine, Urine, Clean Catch [[id number]] Collected:  05/12/20 0505    Specimen:  Urine, Clean Catch Updated:  05/12/20 0643    S. Pneumo Ag Urine or CSF - Urine, Urine, Clean Catch [[id number]] Collected:  05/12/20 0505    Specimen:  Urine, Clean Catch Updated:  05/12/20 0643    Urine Drug Screen - Urine, Clean Catch [[id number]]  (Abnormal) Collected:  05/12/20 0504    Specimen:  Urine, Clean Catch Updated:  05/12/20 0553     THC, Screen, Urine Positive     Phencyclidine (PCP), Urine Negative     Cocaine Screen, Urine Negative     Methamphetamine, Ur Negative     Opiate Screen Negative     Amphetamine Screen, Urine Positive     Benzodiazepine Screen, Urine Negative     Tricyclic Antidepressants Screen Negative     Methadone Screen, Urine Negative     Barbiturates Screen, Urine Negative     Oxycodone Screen, Urine Negative     Propoxyphene Screen Negative     Buprenorphine, Screen, Urine Negative    Narrative:       Limitations of this procedure include the possibility of false positives due to interfering substances in the urine sample. Clinical data should be correlated with any questionable result. Positive results should be considered Presumptive Positive until results are confirmed with another methodology such as HPLC or GCMS.    Urinalysis With Culture If Indicated - Urine, Clean Catch [[id number]]  (Normal) Collected:  05/12/20 0505    Specimen:  Urine, Clean Catch Updated:  05/12/20 0545     Color, UA Yellow     Appearance, UA Clear     pH, UA <=5.0      Specific Gravity, UA >=1.030     Glucose, UA Negative     Ketones, UA Negative     Bilirubin, UA Negative     Blood, UA Negative     Protein, UA Negative     Leuk Esterase, UA Negative     Nitrite, UA Negative     Urobilinogen, UA 0.2 E.U./dL    Narrative:       Urine microscopic not indicated.    Ferritin [[id number]]  (Normal) Collected:  05/12/20 0117    Specimen:  Blood Updated:  05/12/20 0457     Ferritin 86.23 ng/mL     Narrative:       Results may be falsely decreased if patient taking Biotin.      Respiratory Panel, PCR - Swab, Nasopharynx [[id number]]  (Normal) Collected:  05/12/20 0317    Specimen:  Swab from Nasopharynx Updated:  05/12/20 0452     ADENOVIRUS, PCR Not Detected     Coronavirus 229E Not Detected     Coronavirus HKU1 Not Detected     Coronavirus NL63 Not Detected     Coronavirus OC43 Not Detected     Human Metapneumovirus Not Detected     Human Rhinovirus/Enterovirus Not Detected     Influenza B PCR Not Detected     Parainfluenza Virus 1 Not Detected     Parainfluenza Virus 2 Not Detected     Parainfluenza Virus 3 Not Detected     Parainfluenza Virus 4 Not Detected     Bordetella pertussis pcr Not Detected     Influenza A H1 2009 PCR Not Detected     Chlamydophila pneumoniae PCR Not Detected     Mycoplasma pneumo by PCR Not Detected     Influenza A PCR Not Detected     Influenza A H3 Not Detected     Influenza A H1 Not Detected     RSV, PCR Not Detected    Narrative:       The coronavirus on the RVP is NOT COVID-19 and is NOT indicative of infection with COVID-19.     Hemoglobin A1c [[id number]]  (Normal) Collected:  05/12/20 0258    Specimen:  Blood Updated:  05/12/20 0437     Hemoglobin A1C 4.90 %     Narrative:       Hemoglobin A1C Ranges:    Increased Risk for Diabetes  5.7% to 6.4%  Diabetes                     >= 6.5%  Diabetic Goal                < 7.0%    Comprehensive Metabolic Panel [[id number]]  (Abnormal) Collected:  05/12/20 0258    Specimen:  Blood Updated:  05/12/20 0435      Glucose 110 mg/dL      BUN 20 mg/dL      Creatinine 1.17 mg/dL      Sodium 140 mmol/L      Potassium 5.2 mmol/L      Chloride 102 mmol/L      CO2 24.2 mmol/L      Calcium 9.9 mg/dL      Total Protein 8.0 g/dL      Albumin 5.40 g/dL      ALT (SGPT) 23 U/L      AST (SGOT) 32 U/L      Alkaline Phosphatase 142 U/L      Total Bilirubin 0.6 mg/dL      eGFR Non African Amer 72 mL/min/1.73      Globulin 2.6 gm/dL      A/G Ratio 2.1 g/dL      BUN/Creatinine Ratio 17.1     Anion Gap 13.8 mmol/L     Narrative:       GFR Normal >60  Chronic Kidney Disease <60  Kidney Failure <15      CBC Auto Differential [[id number]]  (Abnormal) Collected:  05/12/20 0258    Specimen:  Blood Updated:  05/12/20 0425     WBC 27.77 10*3/mm3      RBC 5.80 10*6/mm3      Hemoglobin 18.6 g/dL      Hematocrit 53.2 %      MCV 91.7 fL      MCH 32.1 pg      MCHC 35.0 g/dL      RDW 12.0 %      RDW-SD 40.9 fl      MPV 10.4 fL      Platelets 259 10*3/mm3      Neutrophil % 88.1 %      Lymphocyte % 5.0 %      Monocyte % 5.7 %      Eosinophil % 0.0 %      Basophil % 0.5 %      Immature Grans % 0.7 %      Neutrophils, Absolute 24.50 10*3/mm3      Lymphocytes, Absolute 1.38 10*3/mm3      Monocytes, Absolute 1.57 10*3/mm3      Eosinophils, Absolute 0.00 10*3/mm3      Basophils, Absolute 0.13 10*3/mm3      Immature Grans, Absolute 0.19 10*3/mm3      nRBC 0.0 /100 WBC     POC Glucose TID AC [[id number]] Collected:  05/12/20 0258    Specimen:  Blood Updated:  05/12/20 0418    Magnesium [[id number]]  (Normal) Collected:  05/12/20 0117    Specimen:  Blood Updated:  05/12/20 0400     Magnesium 2.1 mg/dL     D-dimer, Quantitative [[id number]]  (Abnormal) Collected:  05/12/20 0246    Specimen:  Blood Updated:  05/12/20 0334     D-Dimer, Quantitative 1.20 MCGFEU/mL     Lactic Acid, Plasma [[id number]]  (Normal) Collected:  05/12/20 0258    Specimen:  Blood Updated:  05/12/20 0332     Lactate 1.2 mmol/L     Procalcitonin [[id number]]  (Abnormal) Collected:  05/12/20 0242     "Specimen:  Blood Updated:  05/12/20 0332     Procalcitonin 0.07 ng/mL     Narrative:       As a Marker for Sepsis (Non-Neonates):   1. <0.5 ng/mL represents a low risk of severe sepsis and/or septic shock.  1. >2 ng/mL represents a high risk of severe sepsis and/or septic shock.    As a Marker for Lower Respiratory Tract Infections that require antibiotic therapy:  PCT on Admission     Antibiotic Therapy             6-12 Hrs later  > 0.5                Strongly Recommended            >0.25 - <0.5         Recommended  0.1 - 0.25           Discouraged                   Remeasure/reassess PCT  <0.1                 Strongly Discouraged          Remeasure/reassess PCT      As 28 day mortality risk marker: \"Change in Procalcitonin Result\" (> 80 % or <=80 %) if Day 0 (or Day 1) and Day 4 values are available. Refer to http://www.[employer]pct-calculator.com/   Change in PCT <=80 %   A decrease of PCT levels below or equal to 80 % defines a positive change in PCT test result representing a higher risk for 28-day all-cause mortality of patients diagnosed with severe sepsis or septic shock.  Change in PCT > 80 %   A decrease of PCT levels of more than 80 % defines a negative change in PCT result representing a lower risk for 28-day all-cause mortality of patients diagnosed with severe sepsis or septic shock.                Results may be falsely decreased if patient taking Biotin.     SARS-CoV-2 PCR ([place] IN-HOUSE PERFORMED), NP SWAB IN TRANSPORT MEDIA - Swab, Nasopharynx [[id number]] Collected:  05/12/20 0317    Specimen:  Swab from Nasopharynx Updated:  05/12/20 0319    Lactate Dehydrogenase [[id number]]  (Abnormal) Collected:  05/12/20 0117    Specimen:  Blood Updated:  05/12/20 0308      U/L      Comment: Specimen hemolyzed.  Results may be affected.       C-reactive Protein [[id number]]  (Normal) Collected:  05/12/20 0117    Specimen:  Blood Updated:  05/12/20 0308     C-Reactive Protein 0.08 mg/dL     Ethanol " [[id number]] Collected:  05/12/20 0117    Specimen:  Blood Updated:  05/12/20 0308     Ethanol <10 mg/dL      Ethanol % <0.010 %     Blood Culture With GRAYSON - Blood, Arm, Left [[id number]] Collected:  05/12/20 0246    Specimen:  Blood from Arm, Left Updated:  05/12/20 0305    Blood Culture With GRAYSON - Blood, Arm, Right [[id number]] Collected:  05/12/20 0246    Specimen:  Blood from Arm, Right Updated:  05/12/20 0305    BNP [[id number]]  (Normal) Collected:  05/12/20 0117    Specimen:  Blood Updated:  05/12/20 0145     proBNP 10.2 pg/mL     Narrative:       Among patients with dyspnea, NT-proBNP is highly sensitive for the detection of acute congestive heart failure. In addition NT-proBNP of <300 pg/ml effectively rules out acute congestive heart failure with 99% negative predictive value.    Results may be falsely decreased if patient taking Biotin.      Troponin [[id number]]  (Normal) Collected:  05/12/20 0117    Specimen:  Blood Updated:  05/12/20 0145     Troponin T <0.010 ng/mL     Narrative:       Troponin T Reference Range:  <= 0.03 ng/mL-   Negative for AMI  >0.03 ng/mL-     Abnormal for myocardial necrosis.  Clinicians would have to utilize clinical acumen, EKG, Troponin and serial changes to determine if it is an Acute Myocardial Infarction or myocardial injury due to an underlying chronic condition.       Results may be falsely decreased if patient taking Biotin.      Comprehensive Metabolic Panel [[id number]]  (Abnormal) Collected:  05/12/20 0117    Specimen:  Blood Updated:  05/12/20 0142     Glucose 147 mg/dL      BUN 19 mg/dL      Creatinine 1.11 mg/dL      Sodium 141 mmol/L      Potassium 4.3 mmol/L      Chloride 102 mmol/L      CO2 24.6 mmol/L      Calcium 9.8 mg/dL      Total Protein 7.3 g/dL      Albumin 4.80 g/dL      ALT (SGPT) 18 U/L      AST (SGOT) 28 U/L      Alkaline Phosphatase 127 U/L      Total Bilirubin 0.4 mg/dL      eGFR Non African Amer 77 mL/min/1.73      Globulin 2.5 gm/dL      A/G Ratio 1.9  g/dL      BUN/Creatinine Ratio 17.1     Anion Gap 14.4 mmol/L     Narrative:       GFR Normal >60  Chronic Kidney Disease <60  Kidney Failure <15      CBC & Differential [[id number]] Collected:  05/12/20 0117    Specimen:  Blood Updated:  05/12/20 0123    Narrative:       The following orders were created for panel order CBC & Differential.  Procedure                               Abnormality         Status                     ---------                               -----------         ------                     CBC Auto Differential[[id number]]        Abnormal            Final result                 Please view results for these tests on the individual orders.    CBC Auto Differential [[id number]]  (Abnormal) Collected:  05/12/20 0117    Specimen:  Blood Updated:  05/12/20 0123     WBC 26.38 10*3/mm3      RBC 5.39 10*6/mm3      Hemoglobin 17.2 g/dL      Hematocrit 48.9 %      MCV 90.7 fL      MCH 31.9 pg      MCHC 35.2 g/dL      RDW 12.1 %      RDW-SD 39.8 fl      MPV 10.0 fL      Platelets 252 10*3/mm3      Neutrophil % 87.4 %      Lymphocyte % 6.0 %      Monocyte % 5.6 %      Eosinophil % 0.0 %      Basophil % 0.3 %      Immature Grans % 0.7 %      Neutrophils, Absolute 23.07 10*3/mm3      Lymphocytes, Absolute 1.57 10*3/mm3      Monocytes, Absolute 1.47 10*3/mm3      Eosinophils, Absolute 0.01 10*3/mm3      Basophils, Absolute 0.08 10*3/mm3      Immature Grans, Absolute 0.18 10*3/mm3      nRBC 0.0 /100 WBC         Imaging Results (Last 24 Hours)     Procedure Component Value Units Date/Time    XR Chest 1 View [[id number]] Collected:  05/12/20 0855     Updated:  05/12/20 0902    Narrative:       PORTABLE CHEST     INDICATION: Overdose. Hypoxia.     FINDINGS: Single frontal portable chest compared with 12/07/2016. EKG  leads overlie the chest. Heart size is normal. No pneumothorax. No  effusion. There are some patchy airspace opacities in the right greater  than left lung.       Impression:       Patchy airspace  opacities right greater than left lung which  could represent developing infiltrates from pneumonia. Mild fluid  overload/edema not excluded. Follow-up to resolution recommended.     This report was finalized on 5/12/2020 9:00 AM by Jose M Leal MD.    CT Chest Pulmonary Embolism [[id number]] Collected:  05/12/20 0801     Updated:  05/12/20 0809    Narrative:       PROCEDURE: CT CHEST PULMONARY EMBOLISM-     HISTORY: Hypoxia and elevated d-dimer.     The patient was injected with intravenous contrast. Axial images were  obtained through the chest in a PE protocol. Maximum intensity  projection reconstruction images were also performed. This study was  performed with techniques to keep radiation doses as low as reasonably  achievable, (ALARA). Individualized dose reduction techniques using  automated exposure control or adjustment of mA and/or kV according to  the patient size were employed.        FINDINGS: No previous CT. Contrast bolus is somewhat heterogeneous. At  the time of image acquisition, contrast in the aorta was similar to  slightly more dense than contrast in the pulmonary arteries. Allowing  for this, no pulmonary embolism identified. No aortic dissection.  Scattered calcifications, likely due to old granulomatous disease. Heart  size is normal. Scattered normal size lymph nodes without adenopathy by  size criteria. There is a right hilar lymph node which is near the upper  limits of normal. No effusions. Visualized portions of the upper abdomen  demonstrate no acute abnormality. No pneumothorax. Multifocal bilateral  ground glass and airspace infiltrates are identified. These are slightly  greater towards the right and involve the upper lobes and lower lobes.  There is only trace involvement of the right middle lobe and lingula.       Impression:       Bilateral infiltrates most concerning for multifocal  pneumonia. Edema thought to be much less likely. Short-term follow-up to  resolution recommended.          This report was finalized on 5/12/2020 8:07 AM by Jose M Leal MD.        Physician Progress Notes (last 24 hours) (Notes from 05/11/20 0912 through 05/12/20 0912)    No notes of this type exist for this encounter.         Consult Notes (last 24 hours) (Notes from 05/11/20 0912 through 05/12/20 0912)    No notes of this type exist for this encounter.

## 2020-05-13 ENCOUNTER — APPOINTMENT (OUTPATIENT)
Dept: GENERAL RADIOLOGY | Facility: HOSPITAL | Age: 35
End: 2020-05-13

## 2020-05-13 LAB
ALBUMIN SERPL-MCNC: 3.4 G/DL (ref 3.5–5.2)
ALBUMIN/GLOB SERPL: 1.8 G/DL
ALP SERPL-CCNC: 82 U/L (ref 39–117)
ALT SERPL W P-5'-P-CCNC: 15 U/L (ref 1–41)
ANION GAP SERPL CALCULATED.3IONS-SCNC: 11 MMOL/L (ref 5–15)
AST SERPL-CCNC: 19 U/L (ref 1–40)
BASOPHILS # BLD AUTO: 0.04 10*3/MM3 (ref 0–0.2)
BASOPHILS NFR BLD AUTO: 0.3 % (ref 0–1.5)
BILIRUB SERPL-MCNC: 0.5 MG/DL (ref 0.2–1.2)
BUN BLD-MCNC: 13 MG/DL (ref 6–20)
BUN/CREAT SERPL: 13 (ref 7–25)
CALCIUM SPEC-SCNC: 8 MG/DL (ref 8.6–10.5)
CHLORIDE SERPL-SCNC: 107 MMOL/L (ref 98–107)
CO2 SERPL-SCNC: 22 MMOL/L (ref 22–29)
CREAT BLD-MCNC: 1 MG/DL (ref 0.76–1.27)
DEPRECATED RDW RBC AUTO: 40.4 FL (ref 37–54)
EOSINOPHIL # BLD AUTO: 0.17 10*3/MM3 (ref 0–0.4)
EOSINOPHIL NFR BLD AUTO: 1.2 % (ref 0.3–6.2)
ERYTHROCYTE [DISTWIDTH] IN BLOOD BY AUTOMATED COUNT: 12 % (ref 12.3–15.4)
GFR SERPL CREATININE-BSD FRML MDRD: 87 ML/MIN/1.73
GLOBULIN UR ELPH-MCNC: 1.9 GM/DL
GLUCOSE BLD-MCNC: 103 MG/DL (ref 65–99)
GLUCOSE BLDC GLUCOMTR-MCNC: 114 MG/DL (ref 70–130)
GLUCOSE BLDC GLUCOMTR-MCNC: 114 MG/DL (ref 70–130)
GLUCOSE BLDC GLUCOMTR-MCNC: 96 MG/DL (ref 70–130)
HCT VFR BLD AUTO: 38.2 % (ref 37.5–51)
HGB BLD-MCNC: 13.2 G/DL (ref 13–17.7)
IMM GRANULOCYTES # BLD AUTO: 0.05 10*3/MM3 (ref 0–0.05)
IMM GRANULOCYTES NFR BLD AUTO: 0.4 % (ref 0–0.5)
LYMPHOCYTES # BLD AUTO: 2.02 10*3/MM3 (ref 0.7–3.1)
LYMPHOCYTES NFR BLD AUTO: 14.4 % (ref 19.6–45.3)
MCH RBC QN AUTO: 31.7 PG (ref 26.6–33)
MCHC RBC AUTO-ENTMCNC: 34.6 G/DL (ref 31.5–35.7)
MCV RBC AUTO: 91.6 FL (ref 79–97)
MONOCYTES # BLD AUTO: 1.12 10*3/MM3 (ref 0.1–0.9)
MONOCYTES NFR BLD AUTO: 8 % (ref 5–12)
NEUTROPHILS # BLD AUTO: 10.63 10*3/MM3 (ref 1.7–7)
NEUTROPHILS NFR BLD AUTO: 75.7 % (ref 42.7–76)
NRBC BLD AUTO-RTO: 0 /100 WBC (ref 0–0.2)
PLATELET # BLD AUTO: 168 10*3/MM3 (ref 140–450)
PMV BLD AUTO: 10.5 FL (ref 6–12)
POTASSIUM BLD-SCNC: 3.4 MMOL/L (ref 3.5–5.2)
PROT SERPL-MCNC: 5.3 G/DL (ref 6–8.5)
RBC # BLD AUTO: 4.17 10*6/MM3 (ref 4.14–5.8)
SODIUM BLD-SCNC: 140 MMOL/L (ref 136–145)
VANCOMYCIN TROUGH SERPL-MCNC: 9.1 MCG/ML (ref 5–20)
WBC NRBC COR # BLD: 14.03 10*3/MM3 (ref 3.4–10.8)

## 2020-05-13 PROCEDURE — 82962 GLUCOSE BLOOD TEST: CPT

## 2020-05-13 PROCEDURE — 85025 COMPLETE CBC W/AUTO DIFF WBC: CPT | Performed by: INTERNAL MEDICINE

## 2020-05-13 PROCEDURE — 80202 ASSAY OF VANCOMYCIN: CPT | Performed by: INTERNAL MEDICINE

## 2020-05-13 PROCEDURE — 25010000002 PIPERACILLIN SOD-TAZOBACTAM PER 1 G: Performed by: INTERNAL MEDICINE

## 2020-05-13 PROCEDURE — 94799 UNLISTED PULMONARY SVC/PX: CPT

## 2020-05-13 PROCEDURE — 25010000002 VANCOMYCIN 5 G RECONSTITUTED SOLUTION 5,000 MG VIAL: Performed by: FAMILY MEDICINE

## 2020-05-13 PROCEDURE — 80053 COMPREHEN METABOLIC PANEL: CPT | Performed by: INTERNAL MEDICINE

## 2020-05-13 PROCEDURE — 25010000002 VANCOMYCIN 5 G RECONSTITUTED SOLUTION 5,000 MG VIAL: Performed by: INTERNAL MEDICINE

## 2020-05-13 PROCEDURE — 25010000002 PIPERACILLIN SOD-TAZOBACTAM PER 1 G: Performed by: FAMILY MEDICINE

## 2020-05-13 PROCEDURE — 71045 X-RAY EXAM CHEST 1 VIEW: CPT

## 2020-05-13 RX ORDER — POTASSIUM CHLORIDE 1.5 G/1.77G
20 POWDER, FOR SOLUTION ORAL 2 TIMES DAILY
Status: COMPLETED | OUTPATIENT
Start: 2020-05-13 | End: 2020-05-13

## 2020-05-13 RX ADMIN — BUPROPION HYDROCHLORIDE 100 MG: 100 TABLET, FILM COATED ORAL at 08:23

## 2020-05-13 RX ADMIN — LISDEXAMFETAMINE DIMESYLATE 70 MG: 70 CAPSULE ORAL at 07:00

## 2020-05-13 RX ADMIN — NICOTINE 1 PATCH: 21 PATCH TRANSDERMAL at 04:00

## 2020-05-13 RX ADMIN — TAZOBACTAM SODIUM AND PIPERACILLIN SODIUM 4.5 G: 500; 4 INJECTION, SOLUTION INTRAVENOUS at 01:10

## 2020-05-13 RX ADMIN — DEXTROAMPHETAMINE SACCHARATE, AMPHETAMINE ASPARTATE MONOHYDRATE, DEXTROAMPHETAMINE SULFATE AND AMPHETAMINE SULFATE 20 MG: 5; 5; 5; 5 TABLET ORAL at 17:11

## 2020-05-13 RX ADMIN — MUPIROCIN: 20 OINTMENT TOPICAL at 10:48

## 2020-05-13 RX ADMIN — MUPIROCIN: 20 OINTMENT TOPICAL at 20:00

## 2020-05-13 RX ADMIN — TAZOBACTAM SODIUM AND PIPERACILLIN SODIUM 4.5 G: 500; 4 INJECTION, SOLUTION INTRAVENOUS at 10:10

## 2020-05-13 RX ADMIN — POTASSIUM CHLORIDE 20 MEQ: 1.5 POWDER, FOR SOLUTION ORAL at 10:13

## 2020-05-13 RX ADMIN — DEXTROAMPHETAMINE SACCHARATE, AMPHETAMINE ASPARTATE MONOHYDRATE, DEXTROAMPHETAMINE SULFATE AND AMPHETAMINE SULFATE 20 MG: 5; 5; 5; 5 TABLET ORAL at 11:21

## 2020-05-13 RX ADMIN — VANCOMYCIN HYDROCHLORIDE 1500 MG: 500 INJECTION, POWDER, LYOPHILIZED, FOR SOLUTION INTRAVENOUS at 04:00

## 2020-05-13 RX ADMIN — ALBUTEROL SULFATE 2 PUFF: 90 AEROSOL, METERED RESPIRATORY (INHALATION) at 19:16

## 2020-05-13 RX ADMIN — ALBUTEROL SULFATE 2 PUFF: 90 AEROSOL, METERED RESPIRATORY (INHALATION) at 13:06

## 2020-05-13 RX ADMIN — VANCOMYCIN HYDROCHLORIDE 1500 MG: 500 INJECTION, POWDER, LYOPHILIZED, FOR SOLUTION INTRAVENOUS at 17:11

## 2020-05-13 RX ADMIN — SODIUM CHLORIDE 125 ML/HR: 9 INJECTION, SOLUTION INTRAVENOUS at 06:50

## 2020-05-13 RX ADMIN — SODIUM CHLORIDE, PRESERVATIVE FREE 10 ML: 5 INJECTION INTRAVENOUS at 08:26

## 2020-05-13 RX ADMIN — VENLAFAXINE HYDROCHLORIDE 150 MG: 150 CAPSULE, EXTENDED RELEASE ORAL at 08:23

## 2020-05-13 RX ADMIN — TAZOBACTAM SODIUM AND PIPERACILLIN SODIUM 4.5 G: 500; 4 INJECTION, SOLUTION INTRAVENOUS at 18:54

## 2020-05-13 RX ADMIN — POTASSIUM CHLORIDE 20 MEQ: 1.5 POWDER, FOR SOLUTION ORAL at 20:00

## 2020-05-13 RX ADMIN — SODIUM CHLORIDE 125 ML/HR: 9 INJECTION, SOLUTION INTRAVENOUS at 18:54

## 2020-05-13 NOTE — PROGRESS NOTES
[facility]    Name:  Elvis Joiner   Age:  34 y.o.  Sex:  male  :  1985  MRN:  [id number]   Visit Number:  [id number]  Admission Date:  2020  Date Of Service:  20  Primary Care Physician:  Pilo Domingo MD     LOS: 1 day :  Patient Care Team:  Pilo Domingo MD as PCP - General (Internal Medicine):      Subjective / Interval History:     34-year-old patient who has been admitted because of hypoxic respiratory failure with aspiration pneumonitis and overdose of unknown drug.  He has who can pain is feeling better still hypoxic with saturation on 2 L now in the 90s.  He has been afebrile though has white count and x-ray shows possible early pneumonia.  Patient has no fever there is no cough he has been tested for COVID and is ruled out  His sat is better now and feels better      Vital Signs:    Temp:  [97.5 °F (36.4 °C)-98 °F (36.7 °C)] 97.6 °F (36.4 °C)  Heart Rate:  [] 106  Resp:  [17-20] 20  BP: (101-140)/(62-98) 122/94    Intake and output:    I/O last 3 completed shifts:  In: 4916.7 [P.O.:120; I.V.:4096.7; IV Piggyback:700]  Out: 2500 [Urine:2500]  I/O this shift:  In: 240 [P.O.:240]  Out: 1200 [Urine:1200]    Physical Examination:    General Appearance:    Alert and cooperative, not in any acute distress.   Head:    Atraumatic and normocephalic, without obvious abnormality.   Eyes:            PERRLA,  No pallor. Extraocular movements are within normal limits.   Neck:   Supple,  No lymph glands, no bruit   Lungs:     Chest shape is normal. Breath sounds heard bilaterally equally.  No crackles or wheezing.     Heart:    Normal S1 and S2, no murmur,  No JVD   Abdomen:     Normal bowel sounds, no masses, no organomegaly. Soft     non-tender, no guarding, no rebound tenderness   Extremities:   Moves all extremities well, no edema, no cyanosis,    Skin:   No  bruising or rash.   Neurologic:   Grossly nonfocal and moves all  extremities.      Laboratory results:  Results from last 7 days   Lab Units 05/13/20  0425 05/12/20  0258 05/12/20  0117   SODIUM mmol/L 140 140 141   POTASSIUM mmol/L 3.4* 5.2 4.3   CHLORIDE mmol/L 107 102 102   CO2 mmol/L 22.0 24.2 24.6   BUN mg/dL 13 20 19   CREATININE mg/dL 1.00 1.17 1.11   CALCIUM mg/dL 8.0* 9.9 9.8   BILIRUBIN mg/dL 0.5 0.6 0.4   ALK PHOS U/L 82 142* 127*   ALT (SGPT) U/L 15 23 18   AST (SGOT) U/L 19 32 28   GLUCOSE mg/dL 103* 110* 147*     Results from last 7 days   Lab Units 05/13/20  0425 05/12/20  0258 05/12/20  0117   WBC 10*3/mm3 14.03* 27.77* 26.38*   HEMOGLOBIN g/dL 13.2 18.6* 17.2   HEMATOCRIT % 38.2 53.2* 48.9   PLATELETS 10*3/mm3 168 259 252         Results from last 7 days   Lab Units 05/12/20  0117   TROPONIN T ng/mL <0.010     Results from last 7 days   Lab Units 05/12/20  0246   BLOODCX  No growth at 24 hours  No growth at 24 hours       Radiology results:    Imaging Results (Last 24 Hours)     Procedure Component Value Units Date/Time    XR Chest 1 View [[id number]] Collected:  05/13/20 0753     Updated:  05/13/20 0802    Narrative:       PORTABLE CHEST     INDICATION: Hypoxia. Follow-up infiltrates.     FINDINGS: Single frontal portable chest compared with chest radiograph  dated 05/11/2020 and CT dated 05/12/2020. EKG leads overlie the chest.  Heart size is normal. No pneumothorax or effusion. Consolidative process  in the right upper chest and perihilar region has increased compared to  the chest radiograph and is similar to slightly worsened compared to the  CT. There are also some faint scattered bilateral infiltrates otherwise  again identified.       Impression:       Right greater than left infiltrates are similar to slightly  worsened.     This report was finalized on 5/13/2020 8:00 AM by Jose M Leal MD.          I have reviewed the patient's radiology reports.    Medication Review:     I have reviewed the patients active and prn medications.     Assessment:      Acute  respiratory failure with hypoxia (CMS/HCC)    Accidental overdose of heroin (CMS/HCC)    Leukocytosis    Non-intractable vomiting    Elevated d-dimer    Hyperglycemia    Sepsis, unspecified organism (CMS/HCC)    Tachycardia    Aspiration pneumonia due to vomitus (CMS/[facility])          Plan:    1.  Acute hypoxic respiratory failure-this seems to be because of possible aspiration pneumonitis    2.  Bilateral pneumonia and this seems to be aspiration related and continue with IV antibiotic at present and wbc is improving      3.  Overdose of recreational drug-he thought he had heroin but the drug screen did not show heroin and he has been on amphetamines and benzos which was positive in his system but that was prescribed as per the patient and no other drugs except marijuana noted    4.  Sepsis this is simple sepsis and is stable and follow-up on white count    Pilo Domingo MD  05/13/20  12:58      Please note that portions of this note may have been completed with a voice recognition program. Efforts were made to edit the dictations, but occasionally words are mistranscribed.

## 2020-05-13 NOTE — PHARMACY RECOMMENDATION
"Pharmacokinetic Follow-up Note - Vancomycin     Elvis Joiner is a 34 y.o. male  182.9 cm (72\") 96.7 kg (213 lb 3 oz)     Indication for use: Pneumonia    Results from last 7 days   Lab Units 05/13/20  0425 05/12/20  0258 05/12/20  0117   WBC 10*3/mm3 14.03* 27.77* 26.38*   CREATININE mg/dL 1.00 1.17 1.11      Estimated Creatinine Clearance: 127.8 mL/min (by C-G formula based on SCr of 1 mg/dL).  Temp Readings from Last 1 Encounters:   05/13/20 98.6 °F (37 °C) (Oral)     Lab Results   Component Value Date    [facility] 9.10 05/13/2020       Microbiology:  Microbiology Results (last 10 days)       Procedure Component Value - Date/Time    MRSA Screen, PCR (Inpatient) - Swab, Nares [[id number]]  (Abnormal) Collected:  05/12/20 0734    Lab Status:  Final result Specimen:  Swab from Nares Updated:  05/12/20 1924     MRSA PCR MRSA Detected    Legionella Antigen, Urine - Urine, Urine, Clean Catch [[id number]]  (Normal) Collected:  05/12/20 0505    Lab Status:  Final result Specimen:  Urine, Clean Catch Updated:  05/12/20 1232     LEGIONELLA ANTIGEN, URINE Negative    S. Pneumo Ag Urine or CSF - Urine, Urine, Clean Catch [[id number]]  (Normal) Collected:  05/12/20 0505    Lab Status:  Final result Specimen:  Urine, Clean Catch Updated:  05/12/20 1233     Strep Pneumo Ag Negative    SARS-CoV-2 PCR ([place] IN-HOUSE PERFORMED), NP SWAB IN TRANSPORT MEDIA - Swab, Nasopharynx [[id number]]  (Normal) Collected:  05/12/20 0317    Lab Status:  Final result Specimen:  Swab from Nasopharynx Updated:  05/12/20 1431     COVID19 Not Detected    Respiratory Panel, PCR - Swab, Nasopharynx [[id number]]  (Normal) Collected:  05/12/20 0317    Lab Status:  Final result Specimen:  Swab from Nasopharynx Updated:  05/12/20 0452     ADENOVIRUS, PCR Not Detected     Coronavirus 229E Not Detected     Coronavirus HKU1 Not Detected     Coronavirus NL63 Not Detected     Coronavirus OC43 Not Detected     Human Metapneumovirus Not Detected     " Human Rhinovirus/Enterovirus Not Detected     Influenza B PCR Not Detected     Parainfluenza Virus 1 Not Detected     Parainfluenza Virus 2 Not Detected     Parainfluenza Virus 3 Not Detected     Parainfluenza Virus 4 Not Detected     Bordetella pertussis pcr Not Detected     Influenza A H1 2009 PCR Not Detected     Chlamydophila pneumoniae PCR Not Detected     Mycoplasma pneumo by PCR Not Detected     Influenza A PCR Not Detected     Influenza A H3 Not Detected     Influenza A H1 Not Detected     RSV, PCR Not Detected    Narrative:       The coronavirus on the RVP is NOT COVID-19 and is NOT indicative of infection with COVID-19.     Blood Culture With GRAYSON - Blood, Arm, Right [[id number]] Collected:  05/12/20 0246    Lab Status:  Preliminary result Specimen:  Blood from Arm, Right Updated:  05/13/20 0315     Blood Culture No growth at 24 hours    Blood Culture With GRAYSON - Blood, Arm, Left [[id number]] Collected:  05/12/20 0246    Lab Status:  Preliminary result Specimen:  Blood from Arm, Left Updated:  05/13/20 0315     Blood Culture No growth at 24 hours            Current Vancomycin Dose:  Vancomycin 1500 mg IV q 12 hrs, day 2 of therapy.    Other Antimicrobials: Piperacillin/Tazobactam (Zosyn) 4.5 gm IV q 8 hrs (extended infusion time and dosing interval).    Assessment/Plan:  Patient's Vancomycin dose was based on an incorrect weight on admission. For patient's weight of 96.7 kg, increasing Vancomycin to 1750 mg (18.1 mg/kg) IV q 12 hrs. Obtaining another Vancomycin trough 5/15 1530. Pharmacy will continue to monitor renal function and adjust dose accordingly.    Thank you for the opportunity to consult on this patient.    Varun Mustafa, Pharm.D.  05/13/20  19:55

## 2020-05-13 NOTE — PLAN OF CARE
Problem: Pneumonia (Adult)  Goal: Signs and Symptoms of Listed Potential Problems Will be Absent, Minimized or Managed (Pneumonia)  Outcome: Ongoing (interventions implemented as appropriate)     Problem: Patient Care Overview  Goal: Plan of Care Review  Outcome: Ongoing (interventions implemented as appropriate)  Flowsheets (Taken 5/13/2020 8490)  Progress: improving  Plan of Care Reviewed With: patient     Problem: Patient Care Overview  Goal: Individualization and Mutuality  Outcome: Ongoing (interventions implemented as appropriate)

## 2020-05-13 NOTE — PLAN OF CARE
Problem: Patient Care Overview  Goal: Plan of Care Review  5/12/2020 2324 by Cheryl Noe RN  Outcome: Ongoing (interventions implemented as appropriate)  Flowsheets (Taken 5/12/2020 2000)  Outcome Summary: pleasant patient with no complaint of pain-IV fluids infusing-scheduled IV antibiotics per Dr. Domingo's orders-monitor labs and continue to monitor patient

## 2020-05-13 NOTE — PROGRESS NOTES
Continued Stay Note  MACR García     Patient Name: Elvis Joiner  MRN: [id number]  Today's Date: 5/13/2020    Admit Date: 5/11/2020    Discharge Plan     Row Name 05/13/20 1255       Plan    Plan  Provided pt. with virtual AA/NA meeting list. No other needs voiced.        Discharge Codes    No documentation.       Expected Discharge Date and Time     Expected Discharge Date Expected Discharge Time    May 15, 2020             Katrin Tanner LCSW

## 2020-05-13 NOTE — PLAN OF CARE
Problem: Patient Care Overview  Goal: Plan of Care Review  Outcome: Ongoing (interventions implemented as appropriate)  Flowsheets (Taken 5/12/2020 2000)  Progress: improving  Plan of Care Reviewed With: patient  Outcome Summary: pleasant patient with no complaint of pain-IV fluids infusing-scheduled IV antibiotics per Chayo Alvarado's orders-monitor labs and continue to monitor patient

## 2020-05-14 ENCOUNTER — READMISSION MANAGEMENT (OUTPATIENT)
Dept: CALL CENTER | Facility: HOSPITAL | Age: 35
End: 2020-05-14

## 2020-05-14 VITALS
SYSTOLIC BLOOD PRESSURE: 128 MMHG | TEMPERATURE: 98 F | RESPIRATION RATE: 18 BRPM | HEART RATE: 102 BPM | OXYGEN SATURATION: 99 % | WEIGHT: 213.19 LBS | HEIGHT: 72 IN | BODY MASS INDEX: 28.88 KG/M2 | DIASTOLIC BLOOD PRESSURE: 75 MMHG

## 2020-05-14 LAB
ALBUMIN SERPL-MCNC: 3.5 G/DL (ref 3.5–5.2)
ALBUMIN/GLOB SERPL: 1.5 G/DL
ALP SERPL-CCNC: 85 U/L (ref 39–117)
ALT SERPL W P-5'-P-CCNC: 13 U/L (ref 1–41)
ANION GAP SERPL CALCULATED.3IONS-SCNC: 12.8 MMOL/L (ref 5–15)
AST SERPL-CCNC: 15 U/L (ref 1–40)
BASOPHILS # BLD AUTO: 0.04 10*3/MM3 (ref 0–0.2)
BASOPHILS NFR BLD AUTO: 0.3 % (ref 0–1.5)
BILIRUB SERPL-MCNC: 1 MG/DL (ref 0.2–1.2)
BUN BLD-MCNC: 5 MG/DL (ref 6–20)
BUN/CREAT SERPL: 6 (ref 7–25)
CALCIUM SPEC-SCNC: 8.9 MG/DL (ref 8.6–10.5)
CHLORIDE SERPL-SCNC: 106 MMOL/L (ref 98–107)
CO2 SERPL-SCNC: 22.2 MMOL/L (ref 22–29)
CREAT BLD-MCNC: 0.83 MG/DL (ref 0.76–1.27)
DEPRECATED RDW RBC AUTO: 38.5 FL (ref 37–54)
EOSINOPHIL # BLD AUTO: 0.16 10*3/MM3 (ref 0–0.4)
EOSINOPHIL NFR BLD AUTO: 1.3 % (ref 0.3–6.2)
ERYTHROCYTE [DISTWIDTH] IN BLOOD BY AUTOMATED COUNT: 11.9 % (ref 12.3–15.4)
GFR SERPL CREATININE-BSD FRML MDRD: 107 ML/MIN/1.73
GLOBULIN UR ELPH-MCNC: 2.3 GM/DL
GLUCOSE BLD-MCNC: 110 MG/DL (ref 65–99)
HCT VFR BLD AUTO: 38.1 % (ref 37.5–51)
HGB BLD-MCNC: 13.5 G/DL (ref 13–17.7)
IMM GRANULOCYTES # BLD AUTO: 0.07 10*3/MM3 (ref 0–0.05)
IMM GRANULOCYTES NFR BLD AUTO: 0.6 % (ref 0–0.5)
LYMPHOCYTES # BLD AUTO: 1.8 10*3/MM3 (ref 0.7–3.1)
LYMPHOCYTES NFR BLD AUTO: 14.2 % (ref 19.6–45.3)
MCH RBC QN AUTO: 31.8 PG (ref 26.6–33)
MCHC RBC AUTO-ENTMCNC: 35.4 G/DL (ref 31.5–35.7)
MCV RBC AUTO: 89.6 FL (ref 79–97)
MONOCYTES # BLD AUTO: 1.17 10*3/MM3 (ref 0.1–0.9)
MONOCYTES NFR BLD AUTO: 9.2 % (ref 5–12)
NEUTROPHILS # BLD AUTO: 9.42 10*3/MM3 (ref 1.7–7)
NEUTROPHILS NFR BLD AUTO: 74.4 % (ref 42.7–76)
NRBC BLD AUTO-RTO: 0 /100 WBC (ref 0–0.2)
PLATELET # BLD AUTO: 164 10*3/MM3 (ref 140–450)
PMV BLD AUTO: 10.6 FL (ref 6–12)
POTASSIUM BLD-SCNC: 3.6 MMOL/L (ref 3.5–5.2)
PROCALCITONIN SERPL-MCNC: 0.11 NG/ML (ref 0.1–0.25)
PROT SERPL-MCNC: 5.8 G/DL (ref 6–8.5)
RBC # BLD AUTO: 4.25 10*6/MM3 (ref 4.14–5.8)
SODIUM BLD-SCNC: 141 MMOL/L (ref 136–145)
WBC NRBC COR # BLD: 12.66 10*3/MM3 (ref 3.4–10.8)

## 2020-05-14 PROCEDURE — 80053 COMPREHEN METABOLIC PANEL: CPT | Performed by: INTERNAL MEDICINE

## 2020-05-14 PROCEDURE — 25010000002 VANCOMYCIN 5 G RECONSTITUTED SOLUTION 5,000 MG VIAL: Performed by: INTERNAL MEDICINE

## 2020-05-14 PROCEDURE — 25010000002 PIPERACILLIN SOD-TAZOBACTAM PER 1 G: Performed by: INTERNAL MEDICINE

## 2020-05-14 PROCEDURE — 84145 PROCALCITONIN (PCT): CPT | Performed by: INTERNAL MEDICINE

## 2020-05-14 PROCEDURE — 85025 COMPLETE CBC W/AUTO DIFF WBC: CPT | Performed by: INTERNAL MEDICINE

## 2020-05-14 RX ORDER — AMOXICILLIN AND CLAVULANATE POTASSIUM 875; 125 MG/1; MG/1
1 TABLET, FILM COATED ORAL 2 TIMES DAILY
Qty: 10 TABLET | Refills: 0 | Status: SHIPPED | OUTPATIENT
Start: 2020-05-14 | End: 2020-06-19

## 2020-05-14 RX ADMIN — BUPROPION HYDROCHLORIDE 100 MG: 100 TABLET, FILM COATED ORAL at 08:11

## 2020-05-14 RX ADMIN — VANCOMYCIN HYDROCHLORIDE 1750 MG: 500 INJECTION, POWDER, LYOPHILIZED, FOR SOLUTION INTRAVENOUS at 04:00

## 2020-05-14 RX ADMIN — NICOTINE 1 PATCH: 21 PATCH TRANSDERMAL at 04:00

## 2020-05-14 RX ADMIN — SODIUM CHLORIDE, PRESERVATIVE FREE 10 ML: 5 INJECTION INTRAVENOUS at 08:14

## 2020-05-14 RX ADMIN — MUPIROCIN: 20 OINTMENT TOPICAL at 08:11

## 2020-05-14 RX ADMIN — TAZOBACTAM SODIUM AND PIPERACILLIN SODIUM 4.5 G: 500; 4 INJECTION, SOLUTION INTRAVENOUS at 02:00

## 2020-05-14 RX ADMIN — TAZOBACTAM SODIUM AND PIPERACILLIN SODIUM 4.5 G: 500; 4 INJECTION, SOLUTION INTRAVENOUS at 01:00

## 2020-05-14 RX ADMIN — VENLAFAXINE HYDROCHLORIDE 150 MG: 150 CAPSULE, EXTENDED RELEASE ORAL at 08:11

## 2020-05-14 RX ADMIN — LISDEXAMFETAMINE DIMESYLATE 70 MG: 70 CAPSULE ORAL at 06:15

## 2020-05-14 NOTE — PLAN OF CARE
Problem: Patient Care Overview  Goal: Plan of Care Review  Outcome: Ongoing (interventions implemented as appropriate)  Flowsheets (Taken 5/13/2020 2000)  Progress: improving  Plan of Care Reviewed With: patient  Outcome Summary: pleasant patient with no complaint of pain-IV fluids infusing-scheduled IV antibiotics per Dr. Domingo's orders-monitor labs and continue to monitor patient-possible discharge home 05- per Dr. Domingo's note

## 2020-05-14 NOTE — DISCHARGE SUMMARY
"    [facility] HOSPITALIST   DISCHARGE SUMMARY      Name:  Elvis Joiner   Age:  34 y.o.  Sex:  male  :  1985  MRN:  [id number]   Visit Number:  [id number]  Primary Care Physician:  Pilo Domingo MD  Date of Discharge:  2020  Admission Date:  2020      Discharge Diagnosis:         Acute respiratory failure with hypoxia (CMS/HCC)    Accidental overdose of heroin (CMS/HCC)    Leukocytosis    Non-intractable vomiting    Elevated d-dimer    Hyperglycemia    Sepsis, unspecified organism (CMS/HCC)    Tachycardia    Aspiration pneumonia due to vomitus (CMS/HCC)          Consults:     Consults     No orders found from 2020 to 2020.          Procedures Performed:                 Hospital Course:   The patient was admitted on 2020  Please see H&P for details on admission HPI and ROS.  Elvis Joiner is a 34 y.o. male with past medical history of ADHD and depression and past history of heroin addiction.  Patient was brought to [facility] ED by EMS after being found unresponsive by his fiancée.  Patient states he has a history of heroin addiction and has previously been through inpatient treatment. He was on Suboxone therapy in the past. He attended sober living in 2018.  And states that he spent greater than 30 days in rehab in 2018.  Patient states that tonight his daughter had stayed with his mother and he was visiting with his previous sponsor.  He states that he was concerned about his friends mental health and noted that his friend was injecting heroin in the bathroom.  Patient states he had already been drinking some beer and had taken a \"bar of Xanax \".  He states due to his history of opiate addiction he relapsed after seeing his friend injecting heroin.  According to ED staff patient was noted to be in the bathroom vomiting prior to EMS arrival.  He was given 4 mg of Narcan by EMS and required 2 mg of additional Narcan upon arrival to the " ED.  Patient continued to be hypoxic with sats dropping as low as 84%.  Therefore the decision was made to admit patient for further observation and evaluation of his hypoxia.    After admission he was switch over to inpatient due to hypoxic respiratory failure and aspiration pneumonia.  Patient had COVID testing which turned out negative.  Patient had improvement with his hypoxemia over the last 24 hours and on room air saturation is in the 90s.  There was no heroin in the system and there was just amphetamines and benzodiazepine and he took ago which was not picked up on the urine drug screen.  He has realized his mistake and would not go back to using drugs as he is been sober for 2 years.  Patient will be discharged home on oral antibiotic and follow-up will be done in the office next Wednesday or Thursday.    Vital Signs:     Temp:  [97.6 °F (36.4 °C)-98.6 °F (37 °C)] 98 °F (36.7 °C)  Heart Rate:  [] 102  Resp:  [16-20] 18  BP: (120-140)/(73-94) 128/75    Physical Exam:     General Appearance:    Alert and cooperative, not in any acute distress.   Head:    Atraumatic and normocephalic, without obvious abnormality.   Eyes:            PERRLA,  No pallor. Extraocular movements are within normal limits.   Neck:   Supple,  No lymph glands, no bruit   Lungs:     Chest shape is normal. Breath sounds heard bilaterally equally.  No crackles or wheezing.     Heart:    Normal S1 and S2, no murmur,  No JVD   Abdomen:     Normal bowel sounds, no masses, no organomegaly. Soft     nontender, no guarding, no rebound tenderness   Extremities:   Moves all extremities well, no edema, no cyanosis,    Skin:   No  bruising or rash.   Neurologic:   Grossly non focal and moves all extrimities.        Pertinent Lab Results:     Results from last 7 days   Lab Units 05/14/20  0555 05/13/20  0425 05/12/20  0258   SODIUM mmol/L 141 140 140   POTASSIUM mmol/L 3.6 3.4* 5.2   CHLORIDE mmol/L 106 107 102   CO2 mmol/L 22.2 22.0 24.2   BUN  mg/dL 5* 13 20   CREATININE mg/dL 0.83 1.00 1.17   CALCIUM mg/dL 8.9 8.0* 9.9   BILIRUBIN mg/dL 1.0 0.5 0.6   ALK PHOS U/L 85 82 142*   ALT (SGPT) U/L 13 15 23   AST (SGOT) U/L 15 19 32   GLUCOSE mg/dL 110* 103* 110*     Results from last 7 days   Lab Units 05/14/20  0555 05/13/20  0425 05/12/20  0258   WBC 10*3/mm3 12.66* 14.03* 27.77*   HEMOGLOBIN g/dL 13.5 13.2 18.6*   HEMATOCRIT % 38.1 38.2 53.2*   PLATELETS 10*3/mm3 164 168 259         Blood Culture   Date Value Ref Range Status   05/12/2020 No growth at 2 days  Preliminary   05/12/2020 No growth at 2 days  Preliminary       Pertinent Radiology Results:    Imaging Results (Most Recent)     Procedure Component Value Units Date/Time    XR Chest 1 View [[id number]] Collected:  05/13/20 0753     Updated:  05/13/20 0802    Narrative:       PORTABLE CHEST     INDICATION: Hypoxia. Follow-up infiltrates.     FINDINGS: Single frontal portable chest compared with chest radiograph  dated 05/11/2020 and CT dated 05/12/2020. EKG leads overlie the chest.  Heart size is normal. No pneumothorax or effusion. Consolidative process  in the right upper chest and perihilar region has increased compared to  the chest radiograph and is similar to slightly worsened compared to the  CT. There are also some faint scattered bilateral infiltrates otherwise  again identified.       Impression:       Right greater than left infiltrates are similar to slightly  worsened.     This report was finalized on 5/13/2020 8:00 AM by Jose M Leal MD.    XR Chest 1 View [[id number]] Collected:  05/12/20 0855     Updated:  05/12/20 0902    Narrative:       PORTABLE CHEST     INDICATION: Overdose. Hypoxia.     FINDINGS: Single frontal portable chest compared with 12/07/2016. EKG  leads overlie the chest. Heart size is normal. No pneumothorax. No  effusion. There are some patchy airspace opacities in the right greater  than left lung.       Impression:       Patchy airspace opacities right greater than left  lung which  could represent developing infiltrates from pneumonia. Mild fluid  overload/edema not excluded. Follow-up to resolution recommended.     This report was finalized on 5/12/2020 9:00 AM by Jose M Leal MD.    CT Chest Pulmonary Embolism [[id number]] Collected:  05/12/20 0801     Updated:  05/12/20 0809    Narrative:       PROCEDURE: CT CHEST PULMONARY EMBOLISM-     HISTORY: Hypoxia and elevated d-dimer.     The patient was injected with intravenous contrast. Axial images were  obtained through the chest in a PE protocol. Maximum intensity  projection reconstruction images were also performed. This study was  performed with techniques to keep radiation doses as low as reasonably  achievable, (ALARA). Individualized dose reduction techniques using  automated exposure control or adjustment of mA and/or kV according to  the patient size were employed.        FINDINGS: No previous CT. Contrast bolus is somewhat heterogeneous. At  the time of image acquisition, contrast in the aorta was similar to  slightly more dense than contrast in the pulmonary arteries. Allowing  for this, no pulmonary embolism identified. No aortic dissection.  Scattered calcifications, likely due to old granulomatous disease. Heart  size is normal. Scattered normal size lymph nodes without adenopathy by  size criteria. There is a right hilar lymph node which is near the upper  limits of normal. No effusions. Visualized portions of the upper abdomen  demonstrate no acute abnormality. No pneumothorax. Multifocal bilateral  ground glass and airspace infiltrates are identified. These are slightly  greater towards the right and involve the upper lobes and lower lobes.  There is only trace involvement of the right middle lobe and lingula.       Impression:       Bilateral infiltrates most concerning for multifocal  pneumonia. Edema thought to be much less likely. Short-term follow-up to  resolution recommended.         This report was finalized  on 5/12/2020 8:07 AM by Jose M Leal MD.                  Discharge Disposition:      Home or Self Care    Discharge Medication:         Discharge Medications      New Medications      Instructions Start Date   amoxicillin-clavulanate 875-125 MG per tablet  Commonly known as:  Augmentin   1 tablet, Oral, 2 Times Daily         Changes to Medications      Instructions Start Date   venlafaxine  MG 24 hr capsule  Commonly known as:  EFFEXOR-XR  What changed:  Another medication with the same name was removed. Continue taking this medication, and follow the directions you see here.   150 mg, Oral, Daily         Continue These Medications      Instructions Start Date   amphetamine-dextroamphetamine 20 MG tablet  Commonly known as:  ADDERALL   20 mg, Oral, 2 Times Daily, At lunch and dinner      buPROPion  MG 24 hr tablet  Commonly known as:  WELLBUTRIN XL   300 mg, Oral, Daily      cetirizine 10 MG tablet  Commonly known as:  zyrTEC   10 mg, Oral, Daily      cloNIDine 0.1 MG tablet  Commonly known as:  CATAPRES   0.1 mg, Oral, Nightly      hydrOXYzine pamoate 25 MG capsule  Commonly known as:  VISTARIL   25-50 mg, Oral, Every 6 Hours PRN      Narcan 4 MG/0.1ML nasal spray  Generic drug:  naloxone   USE 1 SPRAY INTRANSALLY 1 TIME PER DAY FOR OVERDOSE - REPEAT EVERY 3 MIN AS NEEDED ALTERNATE NARES         Stop These Medications    clonazePAM 0.5 MG tablet  Commonly known as:  KlonoPIN     methylPREDNISolone 4 MG tablet  Commonly known as:  MEDROL (SYD)     multivitamin with minerals tablet tablet     naltrexone 50 MG tablet  Commonly known as:  DEPADE     QUEtiapine 50 MG tablet  Commonly known as:  SEROquel     Vyvanse 50 MG capsule  Generic drug:  lisdexamfetamine            Discharge Diet:             Follow-up Appointments:      No future appointments.      Test Results Pending at Discharge:       Order Current Status    POC Glucose TID AC In process    Blood Culture With GRAYSON - Blood, Arm, Left Preliminary  result    Blood Culture With GRAYSON - Blood, Arm, Right Preliminary result             Pilo Domingo MD  05/14/20  09:27    Time:  Discharge 30 min    Please note that portions of this note may have been completed with a voice recognition program. Efforts were made to edit the dictations, but occasionally words are mistranscribed.

## 2020-05-14 NOTE — OUTREACH NOTE
Prep Survey      Responses   [facility] discharged from?  [facility]   Is LACE score < 7 ?  Yes   Eligibility  Not Eligible   What are the reasons patient is not eligible?  Behavioral Health   COVID-19 Test Status  Negative   Does the patient have one of the following disease processes/diagnoses(primary or secondary)?  COPD/Pneumonia   Prep survey completed?  Yes          Ana Borden RN

## 2020-05-15 ENCOUNTER — TRANSITIONAL CARE MANAGEMENT TELEPHONE ENCOUNTER (OUTPATIENT)
Dept: CALL CENTER | Facility: HOSPITAL | Age: 35
End: 2020-05-15

## 2020-05-15 NOTE — OUTREACH NOTE
Call Center TCM Note      Responses   [facility] patient discharged from?  [facility]   COVID-19 Test Status  Negative   Does the patient have one of the following disease processes/diagnoses(primary or secondary)?  Other   TCM attempt successful?  Yes   Call start time  1046   Call end time  1052   Meds reviewed with patient/caregiver?  Yes   Is the patient having any side effects they believe may be caused by any medication additions or changes?  No   Does the patient have all medications ordered at discharge?  Yes   Does the patient have a primary care provider?   Yes   Does the patient have an appointment with their PCP or pulmonologist within 7 days of discharge?  Yes   Has home health visited the patient within 72 hours of discharge?  N/A   Did the patient receive a copy of their discharge instructions?  Yes   Nursing interventions  Reviewed instructions with patient   What is the patient's perception of their health status since discharge?  Same   Nursing Interventions  Nurse provided patient education   Is the patient able to teach back COPD zones?  Yes   Is the patient/caregiver able to teach back signs and symptoms of worsening condition:  Fever/chills, Shortness of breath, Chest pain   Is the patient/caregiver able to teach back importance of completing antibiotic course of treatment?  Yes   TCM call completed?  Yes   Wrap up additional comments  reports he is doing better. glad to be out of hospital and with family          Camille Cota RN    5/15/2020, 10:53

## 2020-05-17 LAB
BACTERIA SPEC AEROBE CULT: NORMAL
BACTERIA SPEC AEROBE CULT: NORMAL

## 2020-06-19 ENCOUNTER — OFFICE VISIT (OUTPATIENT)
Dept: INTERNAL MEDICINE | Facility: CLINIC | Age: 35
End: 2020-06-19

## 2020-06-19 VITALS
SYSTOLIC BLOOD PRESSURE: 132 MMHG | OXYGEN SATURATION: 99 % | HEIGHT: 72 IN | BODY MASS INDEX: 26.66 KG/M2 | WEIGHT: 196.8 LBS | TEMPERATURE: 96.9 F | RESPIRATION RATE: 16 BRPM | DIASTOLIC BLOOD PRESSURE: 87 MMHG | HEART RATE: 101 BPM

## 2020-06-19 DIAGNOSIS — F90.9 ATTENTION DEFICIT HYPERACTIVITY DISORDER (ADHD), UNSPECIFIED ADHD TYPE: Primary | ICD-10-CM

## 2020-06-19 DIAGNOSIS — R00.0 TACHYCARDIA: ICD-10-CM

## 2020-06-19 DIAGNOSIS — F11.10 HEROIN ABUSE (HCC): ICD-10-CM

## 2020-06-19 PROCEDURE — 99203 OFFICE O/P NEW LOW 30 MIN: CPT | Performed by: INTERNAL MEDICINE

## 2020-06-19 RX ORDER — LISDEXAMFETAMINE DIMESYLATE 70 MG/1
70 CAPSULE ORAL EVERY MORNING
COMMUNITY
Start: 2020-06-05

## 2020-06-19 NOTE — PROGRESS NOTES
Subjective     Patient ID: Elvis Joiner is a 34 y.o. male. Patient is here for management of multiple medical problems.     Chief Complaint   Patient presents with   • Annual Exam     Initial visit to establish care, patient here to discuss drug addiction ([employer])     History of Present Illness       Hx drug of abuse states clean x 3 years. Pt restarted over the covid shut down. Now haroin is in short supply.  OD x 3  on synthetic heroin.   Pt is now involve in [employer].     Pt reports he can't get into [facility].    Pt detox last week and got out last Friday.     Pt on adhd meds and off meds x 1 week.    Pt see Sloane Obregon at [facility].  adderal changed pt life for the positives x 3 years.    Moved up in his job to [place] [employer] [place].  Pt had to take off x 3 days as  was needed.  Pt stayed off 3-4 weeks.  Pt lost job.     Pt now do [employer] work with his dad.                    The following portions of the patient's history were reviewed and updated as appropriate: allergies, current medications, past family history, past medical history, past social history, past surgical history and problem list.    Review of Systems   Constitutional: Negative for fatigue.   Respiratory: Negative for cough and shortness of breath.    Psychiatric/Behavioral: Negative for self-injury and sleep disturbance. The patient is not nervous/anxious.    All other systems reviewed and are negative.      Current Outpatient Medications:   •  amphetamine-dextroamphetamine (ADDERALL) 20 MG tablet, Take 20 mg by mouth 2 (Two) Times a Day. At lunch and dinner, Disp: , Rfl: 0  •  buPROPion XL (WELLBUTRIN XL) 150 MG 24 hr tablet, Take 300 mg by mouth Daily., Disp: , Rfl:   •  cetirizine (zyrTEC) 10 MG tablet, Take 1 tablet by mouth Daily., Disp: 30 tablet, Rfl: 0  •  cloNIDine (CATAPRES) 0.1 MG tablet, Take 0.1 mg by mouth Every Night., Disp: , Rfl:   •  venlafaxine XR (EFFEXOR-XR) 150 MG 24 hr  "capsule, Take 150 mg by mouth Daily., Disp: , Rfl:   •  NARCAN 4 MG/0.1ML nasal spray, USE 1 SPRAY INTRANSALLY 1 TIME PER DAY FOR OVERDOSE - REPEAT EVERY 3 MIN AS NEEDED ALTERNATE NARES, Disp: , Rfl: 1  •  VYVANSE 70 MG capsule, Take 70 mg by mouth Every Morning  , Disp: , Rfl:     Objective      Blood pressure 132/87, pulse 101, temperature 96.9 °F (36.1 °C), temperature source Temporal, resp. rate 16, height 182.9 cm (72\"), weight 89.3 kg (196 lb 12.8 oz), SpO2 99 %.    Physical Exam     General Appearance:    Alert, cooperative, no distress, appears stated age   Head:    Normocephalic, without obvious abnormality, atraumatic   Eyes:    PERRL, conjunctiva/corneas clear, EOM's intact   Ears:    Normal TM's and external ear canals, both ears   Nose:   Nares normal, septum midline, mucosa normal, no drainage   or sinus tenderness   Throat:   Lips, mucosa, and tongue normal; teeth and gums normal   Neck:   Supple, symmetrical, trachea midline, no adenopathy;        thyroid:  No enlargement/tenderness/nodules; no carotid    bruit or JVD   Back:     Symmetric, no curvature, ROM normal, no CVA tenderness   Lungs:     Clear to auscultation bilaterally, respirations unlabored   Chest wall:    No tenderness or deformity   Heart:    Regular rate and rhythm, S1 and S2 normal, no murmur,        rub or gallop   Abdomen:     Soft, non-tender, bowel sounds active all four quadrants,     no masses, no organomegaly   Extremities:   Extremities normal, atraumatic, no cyanosis or edema   Pulses:   2+ and symmetric all extremities   Skin:   Skin color, texture, turgor normal, no rashes or lesions   Lymph nodes:   Cervical, supraclavicular, and axillary nodes normal   Neurologic:   CNII-XII intact. Normal strength, sensation and reflexes       throughout      Results for orders placed or performed during the hospital encounter of 05/11/20   SARS-CoV-2 PCR ([facility] IN-HOUSE PERFORMED), NP SWAB IN TRANSPORT MEDIA - Swab, Nasopharynx "   Result Value Ref Range    COVID19 Not Detected Not Detected - Ref. Range   Respiratory Panel, PCR - Swab, Nasopharynx   Result Value Ref Range    ADENOVIRUS, PCR Not Detected Not Detected    Coronavirus 229E Not Detected Not Detected    Coronavirus HKU1 Not Detected Not Detected    Coronavirus NL63 Not Detected Not Detected    Coronavirus OC43 Not Detected Not Detected    Human Metapneumovirus Not Detected Not Detected    Human Rhinovirus/Enterovirus Not Detected Not Detected    Influenza B PCR Not Detected Not Detected    Parainfluenza Virus 1 Not Detected Not Detected    Parainfluenza Virus 2 Not Detected Not Detected    Parainfluenza Virus 3 Not Detected Not Detected    Parainfluenza Virus 4 Not Detected Not Detected    Bordetella pertussis pcr Not Detected Not Detected    Influenza A H1 2009 PCR Not Detected Not Detected    Chlamydophila pneumoniae PCR Not Detected Not Detected    Mycoplasma pneumo by PCR Not Detected Not Detected    Influenza A PCR Not Detected Not Detected    Influenza A H3 Not Detected Not Detected    Influenza A H1 Not Detected Not Detected    RSV, PCR Not Detected Not Detected   Blood Culture With GRAYSON - Blood, Arm, Right   Result Value Ref Range    Blood Culture No growth at 5 days    Blood Culture With GRAYSON - Blood, Arm, Left   Result Value Ref Range    Blood Culture No growth at 5 days    MRSA Screen, PCR (Inpatient) - Swab, Nares   Result Value Ref Range    MRSA PCR MRSA Detected (A) No MRSA Detected   Legionella Antigen, Urine - Urine, Urine, Clean Catch   Result Value Ref Range    LEGIONELLA ANTIGEN, URINE Negative Negative   S. Pneumo Ag Urine or CSF - Urine, Urine, Clean Catch   Result Value Ref Range    Strep Pneumo Ag Negative Negative   Comprehensive Metabolic Panel   Result Value Ref Range    Glucose 147 (H) 65 - 99 mg/dL    BUN 19 6 - 20 mg/dL    Creatinine 1.11 0.76 - 1.27 mg/dL    Sodium 141 136 - 145 mmol/L    Potassium 4.3 3.5 - 5.2 mmol/L    Chloride 102 98 - 107 mmol/L     CO2 24.6 22.0 - 29.0 mmol/L    Calcium 9.8 8.6 - 10.5 mg/dL    Total Protein 7.3 6.0 - 8.5 g/dL    Albumin 4.80 3.50 - 5.20 g/dL    ALT (SGPT) 18 1 - 41 U/L    AST (SGOT) 28 1 - 40 U/L    Alkaline Phosphatase 127 (H) 39 - 117 U/L    Total Bilirubin 0.4 0.2 - 1.2 mg/dL    eGFR Non African Amer 77 >60 mL/min/1.73    Globulin 2.5 gm/dL    A/G Ratio 1.9 g/dL    BUN/Creatinine Ratio 17.1 7.0 - 25.0    Anion Gap 14.4 5.0 - 15.0 mmol/L   BNP   Result Value Ref Range    proBNP 10.2 5.0 - 450.0 pg/mL   Troponin   Result Value Ref Range    Troponin T <0.010 0.000 - 0.030 ng/mL   CBC Auto Differential   Result Value Ref Range    WBC 26.38 (H) 3.40 - 10.80 10*3/mm3    RBC 5.39 4.14 - 5.80 10*6/mm3    Hemoglobin 17.2 13.0 - 17.7 g/dL    Hematocrit 48.9 37.5 - 51.0 %    MCV 90.7 79.0 - 97.0 fL    MCH 31.9 26.6 - 33.0 pg    MCHC 35.2 31.5 - 35.7 g/dL    RDW 12.1 (L) 12.3 - 15.4 %    RDW-SD 39.8 37.0 - 54.0 fl    MPV 10.0 6.0 - 12.0 fL    Platelets 252 140 - 450 10*3/mm3    Neutrophil % 87.4 (H) 42.7 - 76.0 %    Lymphocyte % 6.0 (L) 19.6 - 45.3 %    Monocyte % 5.6 5.0 - 12.0 %    Eosinophil % 0.0 (L) 0.3 - 6.2 %    Basophil % 0.3 0.0 - 1.5 %    Immature Grans % 0.7 (H) 0.0 - 0.5 %    Neutrophils, Absolute 23.07 (H) 1.70 - 7.00 10*3/mm3    Lymphocytes, Absolute 1.57 0.70 - 3.10 10*3/mm3    Monocytes, Absolute 1.47 (H) 0.10 - 0.90 10*3/mm3    Eosinophils, Absolute 0.01 0.00 - 0.40 10*3/mm3    Basophils, Absolute 0.08 0.00 - 0.20 10*3/mm3    Immature Grans, Absolute 0.18 (H) 0.00 - 0.05 10*3/mm3    nRBC 0.0 0.0 - 0.2 /100 WBC   Lactic Acid, Plasma   Result Value Ref Range    Lactate 1.2 0.5 - 2.0 mmol/L   Lactate Dehydrogenase   Result Value Ref Range     (H) 135 - 225 U/L   Procalcitonin   Result Value Ref Range    Procalcitonin 0.07 (L) 0.10 - 0.25 ng/mL   D-dimer, Quantitative   Result Value Ref Range    D-Dimer, Quantitative 1.20 (C) 0.00 - 0.57 MCGFEU/mL   C-reactive Protein   Result Value Ref Range    C-Reactive Protein  0.08 0.00 - 0.50 mg/dL   Ethanol   Result Value Ref Range    Ethanol <10 0 - 10 mg/dL    Ethanol % <0.010 %   Urine Drug Screen - Urine, Clean Catch   Result Value Ref Range    THC, Screen, Urine Positive (A) Negative    Phencyclidine (PCP), Urine Negative Negative    Cocaine Screen, Urine Negative Negative    Methamphetamine, Ur Negative Negative    Opiate Screen Negative Negative    Amphetamine Screen, Urine Positive (A) Negative    Benzodiazepine Screen, Urine Negative Negative    Tricyclic Antidepressants Screen Negative Negative    Methadone Screen, Urine Negative Negative    Barbiturates Screen, Urine Negative Negative    Oxycodone Screen, Urine Negative Negative    Propoxyphene Screen Negative Negative    Buprenorphine, Screen, Urine Negative Negative   Urinalysis With Culture If Indicated - Urine, Clean Catch   Result Value Ref Range    Color, UA Yellow Yellow, Straw    Appearance, UA Clear Clear    pH, UA <=5.0 5.0 - 8.0    Specific Gravity, UA >=1.030 1.005 - 1.030    Glucose, UA Negative Negative    Ketones, UA Negative Negative    Bilirubin, UA Negative Negative    Blood, UA Negative Negative    Protein, UA Negative Negative    Leuk Esterase, UA Negative Negative    Nitrite, UA Negative Negative    Urobilinogen, UA 0.2 E.U./dL 0.2 - 1.0 E.U./dL   Magnesium   Result Value Ref Range    Magnesium 2.1 1.6 - 2.6 mg/dL   CBC Auto Differential   Result Value Ref Range    WBC 27.77 (H) 3.40 - 10.80 10*3/mm3    RBC 5.80 4.14 - 5.80 10*6/mm3    Hemoglobin 18.6 (H) 13.0 - 17.7 g/dL    Hematocrit 53.2 (H) 37.5 - 51.0 %    MCV 91.7 79.0 - 97.0 fL    MCH 32.1 26.6 - 33.0 pg    MCHC 35.0 31.5 - 35.7 g/dL    RDW 12.0 (L) 12.3 - 15.4 %    RDW-SD 40.9 37.0 - 54.0 fl    MPV 10.4 6.0 - 12.0 fL    Platelets 259 140 - 450 10*3/mm3    Neutrophil % 88.1 (H) 42.7 - 76.0 %    Lymphocyte % 5.0 (L) 19.6 - 45.3 %    Monocyte % 5.7 5.0 - 12.0 %    Eosinophil % 0.0 (L) 0.3 - 6.2 %    Basophil % 0.5 0.0 - 1.5 %    Immature Grans % 0.7  (H) 0.0 - 0.5 %    Neutrophils, Absolute 24.50 (H) 1.70 - 7.00 10*3/mm3    Lymphocytes, Absolute 1.38 0.70 - 3.10 10*3/mm3    Monocytes, Absolute 1.57 (H) 0.10 - 0.90 10*3/mm3    Eosinophils, Absolute 0.00 0.00 - 0.40 10*3/mm3    Basophils, Absolute 0.13 0.00 - 0.20 10*3/mm3    Immature Grans, Absolute 0.19 (H) 0.00 - 0.05 10*3/mm3    nRBC 0.0 0.0 - 0.2 /100 WBC   Comprehensive Metabolic Panel   Result Value Ref Range    Glucose 110 (H) 65 - 99 mg/dL    BUN 20 6 - 20 mg/dL    Creatinine 1.17 0.76 - 1.27 mg/dL    Sodium 140 136 - 145 mmol/L    Potassium 5.2 3.5 - 5.2 mmol/L    Chloride 102 98 - 107 mmol/L    CO2 24.2 22.0 - 29.0 mmol/L    Calcium 9.9 8.6 - 10.5 mg/dL    Total Protein 8.0 6.0 - 8.5 g/dL    Albumin 5.40 (H) 3.50 - 5.20 g/dL    ALT (SGPT) 23 1 - 41 U/L    AST (SGOT) 32 1 - 40 U/L    Alkaline Phosphatase 142 (H) 39 - 117 U/L    Total Bilirubin 0.6 0.2 - 1.2 mg/dL    eGFR Non African Amer 72 >60 mL/min/1.73    Globulin 2.6 gm/dL    A/G Ratio 2.1 g/dL    BUN/Creatinine Ratio 17.1 7.0 - 25.0    Anion Gap 13.8 5.0 - 15.0 mmol/L   Ferritin   Result Value Ref Range    Ferritin 86.23 30.00 - 400.00 ng/mL   Hemoglobin A1c   Result Value Ref Range    Hemoglobin A1C 4.90 4.80 - 5.60 %   Comprehensive Metabolic Panel   Result Value Ref Range    Glucose 103 (H) 65 - 99 mg/dL    BUN 13 6 - 20 mg/dL    Creatinine 1.00 0.76 - 1.27 mg/dL    Sodium 140 136 - 145 mmol/L    Potassium 3.4 (L) 3.5 - 5.2 mmol/L    Chloride 107 98 - 107 mmol/L    CO2 22.0 22.0 - 29.0 mmol/L    Calcium 8.0 (L) 8.6 - 10.5 mg/dL    Total Protein 5.3 (L) 6.0 - 8.5 g/dL    Albumin 3.40 (L) 3.50 - 5.20 g/dL    ALT (SGPT) 15 1 - 41 U/L    AST (SGOT) 19 1 - 40 U/L    Alkaline Phosphatase 82 39 - 117 U/L    Total Bilirubin 0.5 0.2 - 1.2 mg/dL    eGFR Non African Amer 87 >60 mL/min/1.73    Globulin 1.9 gm/dL    A/G Ratio 1.8 g/dL    BUN/Creatinine Ratio 13.0 7.0 - 25.0    Anion Gap 11.0 5.0 - 15.0 mmol/L   CBC Auto Differential   Result Value Ref Range     WBC 14.03 (H) 3.40 - 10.80 10*3/mm3    RBC 4.17 4.14 - 5.80 10*6/mm3    Hemoglobin 13.2 13.0 - 17.7 g/dL    Hematocrit 38.2 37.5 - 51.0 %    MCV 91.6 79.0 - 97.0 fL    MCH 31.7 26.6 - 33.0 pg    MCHC 34.6 31.5 - 35.7 g/dL    RDW 12.0 (L) 12.3 - 15.4 %    RDW-SD 40.4 37.0 - 54.0 fl    MPV 10.5 6.0 - 12.0 fL    Platelets 168 140 - 450 10*3/mm3    Neutrophil % 75.7 42.7 - 76.0 %    Lymphocyte % 14.4 (L) 19.6 - 45.3 %    Monocyte % 8.0 5.0 - 12.0 %    Eosinophil % 1.2 0.3 - 6.2 %    Basophil % 0.3 0.0 - 1.5 %    Immature Grans % 0.4 0.0 - 0.5 %    Neutrophils, Absolute 10.63 (H) 1.70 - 7.00 10*3/mm3    Lymphocytes, Absolute 2.02 0.70 - 3.10 10*3/mm3    Monocytes, Absolute 1.12 (H) 0.10 - 0.90 10*3/mm3    Eosinophils, Absolute 0.17 0.00 - 0.40 10*3/mm3    Basophils, Absolute 0.04 0.00 - 0.20 10*3/mm3    Immature Grans, Absolute 0.05 0.00 - 0.05 10*3/mm3    nRBC 0.0 0.0 - 0.2 /100 WBC   Vancomycin, Trough   Result Value Ref Range    Vancomycin Trough 9.10 5.00 - 20.00 mcg/mL   Comprehensive Metabolic Panel   Result Value Ref Range    Glucose 110 (H) 65 - 99 mg/dL    BUN 5 (L) 6 - 20 mg/dL    Creatinine 0.83 0.76 - 1.27 mg/dL    Sodium 141 136 - 145 mmol/L    Potassium 3.6 3.5 - 5.2 mmol/L    Chloride 106 98 - 107 mmol/L    CO2 22.2 22.0 - 29.0 mmol/L    Calcium 8.9 8.6 - 10.5 mg/dL    Total Protein 5.8 (L) 6.0 - 8.5 g/dL    Albumin 3.50 3.50 - 5.20 g/dL    ALT (SGPT) 13 1 - 41 U/L    AST (SGOT) 15 1 - 40 U/L    Alkaline Phosphatase 85 39 - 117 U/L    Total Bilirubin 1.0 0.2 - 1.2 mg/dL    eGFR Non African Amer 107 >60 mL/min/1.73    Globulin 2.3 gm/dL    A/G Ratio 1.5 g/dL    BUN/Creatinine Ratio 6.0 (L) 7.0 - 25.0    Anion Gap 12.8 5.0 - 15.0 mmol/L   Procalcitonin   Result Value Ref Range    Procalcitonin 0.11 0.10 - 0.25 ng/mL   CBC Auto Differential   Result Value Ref Range    WBC 12.66 (H) 3.40 - 10.80 10*3/mm3    RBC 4.25 4.14 - 5.80 10*6/mm3    Hemoglobin 13.5 13.0 - 17.7 g/dL    Hematocrit 38.1 37.5 - 51.0 %     MCV 89.6 79.0 - 97.0 fL    MCH 31.8 26.6 - 33.0 pg    MCHC 35.4 31.5 - 35.7 g/dL    RDW 11.9 (L) 12.3 - 15.4 %    RDW-SD 38.5 37.0 - 54.0 fl    MPV 10.6 6.0 - 12.0 fL    Platelets 164 140 - 450 10*3/mm3    Neutrophil % 74.4 42.7 - 76.0 %    Lymphocyte % 14.2 (L) 19.6 - 45.3 %    Monocyte % 9.2 5.0 - 12.0 %    Eosinophil % 1.3 0.3 - 6.2 %    Basophil % 0.3 0.0 - 1.5 %    Immature Grans % 0.6 (H) 0.0 - 0.5 %    Neutrophils, Absolute 9.42 (H) 1.70 - 7.00 10*3/mm3    Lymphocytes, Absolute 1.80 0.70 - 3.10 10*3/mm3    Monocytes, Absolute 1.17 (H) 0.10 - 0.90 10*3/mm3    Eosinophils, Absolute 0.16 0.00 - 0.40 10*3/mm3    Basophils, Absolute 0.04 0.00 - 0.20 10*3/mm3    Immature Grans, Absolute 0.07 (H) 0.00 - 0.05 10*3/mm3    nRBC 0.0 0.0 - 0.2 /100 WBC   POC Glucose Once   Result Value Ref Range    Glucose 126 70 - 130 mg/dL   POC Glucose Once   Result Value Ref Range    Glucose 145 (H) 70 - 130 mg/dL   POC Glucose Once   Result Value Ref Range    Glucose 110 70 - 130 mg/dL   POC Glucose Once   Result Value Ref Range    Glucose 114 70 - 130 mg/dL   POC Glucose Once   Result Value Ref Range    Glucose 96 70 - 130 mg/dL   POC Glucose Once   Result Value Ref Range    Glucose 114 70 - 130 mg/dL         Assessment/Plan   Pt just out of Detox from [facility].     Pt know how to live sober now.         Elvis was seen today for annual exam.    Diagnoses and all orders for this visit:    Attention deficit hyperactivity disorder (ADHD), unspecified ADHD type  -     CBC & Differential  -     Vitamin B12  -     Lipid Panel  -     Comprehensive Metabolic Panel  -     TSH  -     T4, Free  -     Hepatitis Panel, Acute  -     HIV-1 / O / 2 Ag / Antibody 4th Generation  -     RPR  -     Urine Drug Screen - Urine, Clean Catch; Future    Tachycardia  -     CBC & Differential  -     Vitamin B12  -     Lipid Panel  -     Comprehensive Metabolic Panel  -     TSH  -     T4, Free  -     Hepatitis Panel, Acute  -     HIV-1 / O / 2 Ag  / Antibody 4th Generation  -     RPR  -     Urine Drug Screen - Urine, Clean Catch; Future    Heroin abuse (CMS/HCC)  -     CBC & Differential  -     Vitamin B12  -     Lipid Panel  -     Comprehensive Metabolic Panel  -     TSH  -     T4, Free  -     Hepatitis Panel, Acute  -     HIV-1 / O / 2 Ag / Antibody 4th Generation  -     RPR  -     Urine Drug Screen - Urine, Clean Catch; Future      Return in about 1 week (around 6/26/2020).      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   Recommendation for counselor and rehab recommended and supported.      Pt high risk for relaps due to losing Job during the covid shut down.              There are no Patient Instructions on file for this visit.     Porter Handy MD    Assessment/Plan

## 2020-06-24 ENCOUNTER — RESULTS ENCOUNTER (OUTPATIENT)
Dept: INTERNAL MEDICINE | Facility: CLINIC | Age: 35
End: 2020-06-24

## 2020-06-24 DIAGNOSIS — R00.0 TACHYCARDIA: ICD-10-CM

## 2020-06-24 DIAGNOSIS — F90.9 ATTENTION DEFICIT HYPERACTIVITY DISORDER (ADHD), UNSPECIFIED ADHD TYPE: ICD-10-CM
